# Patient Record
Sex: MALE | Race: WHITE | Employment: OTHER | URBAN - NONMETROPOLITAN AREA
[De-identification: names, ages, dates, MRNs, and addresses within clinical notes are randomized per-mention and may not be internally consistent; named-entity substitution may affect disease eponyms.]

---

## 2019-07-10 ENCOUNTER — HOSPITAL ENCOUNTER (EMERGENCY)
Age: 39
Discharge: HOME | End: 2019-07-11
Attending: EMERGENCY MEDICINE
Payer: COMMERCIAL

## 2019-07-10 DIAGNOSIS — S39.012A LUMBOSACRAL STRAIN, INITIAL ENCOUNTER: Primary | ICD-10-CM

## 2019-07-10 PROCEDURE — 99283 EMERGENCY DEPT VISIT LOW MDM: CPT

## 2019-07-11 ENCOUNTER — APPOINTMENT (OUTPATIENT)
Dept: GENERAL RADIOLOGY | Age: 39
End: 2019-07-11
Payer: COMMERCIAL

## 2019-07-11 VITALS
WEIGHT: 200 LBS | SYSTOLIC BLOOD PRESSURE: 142 MMHG | BODY MASS INDEX: 27.7 KG/M2 | TEMPERATURE: 98.3 F | RESPIRATION RATE: 20 BRPM | HEART RATE: 84 BPM | DIASTOLIC BLOOD PRESSURE: 100 MMHG | OXYGEN SATURATION: 98 %

## 2019-07-11 PROCEDURE — 72100 X-RAY EXAM L-S SPINE 2/3 VWS: CPT

## 2019-07-11 PROCEDURE — 6370000000 HC RX 637 (ALT 250 FOR IP): Performed by: EMERGENCY MEDICINE

## 2019-07-11 PROCEDURE — 6360000002 HC RX W HCPCS: Performed by: EMERGENCY MEDICINE

## 2019-07-11 PROCEDURE — 96372 THER/PROPH/DIAG INJ SC/IM: CPT

## 2019-07-11 RX ORDER — KETOROLAC TROMETHAMINE 30 MG/ML
30 INJECTION, SOLUTION INTRAMUSCULAR; INTRAVENOUS ONCE
Status: COMPLETED | OUTPATIENT
Start: 2019-07-11 | End: 2019-07-11

## 2019-07-11 RX ORDER — LORAZEPAM 0.5 MG/1
0.5 TABLET ORAL EVERY 6 HOURS PRN
COMMUNITY
End: 2021-04-01

## 2019-07-11 RX ORDER — OXYCODONE HYDROCHLORIDE AND ACETAMINOPHEN 5; 325 MG/1; MG/1
1 TABLET ORAL ONCE
Status: COMPLETED | OUTPATIENT
Start: 2019-07-11 | End: 2019-07-11

## 2019-07-11 RX ORDER — ORPHENADRINE CITRATE 30 MG/ML
60 INJECTION INTRAMUSCULAR; INTRAVENOUS ONCE
Status: COMPLETED | OUTPATIENT
Start: 2019-07-11 | End: 2019-07-11

## 2019-07-11 RX ORDER — ORPHENADRINE CITRATE 100 MG/1
100 TABLET, EXTENDED RELEASE ORAL 2 TIMES DAILY PRN
Qty: 30 TABLET | Refills: 0 | Status: SHIPPED | OUTPATIENT
Start: 2019-07-11 | End: 2019-07-21

## 2019-07-11 RX ORDER — KETOROLAC TROMETHAMINE 10 MG/1
10 TABLET, FILM COATED ORAL EVERY 6 HOURS PRN
Qty: 20 TABLET | Refills: 0 | Status: SHIPPED | OUTPATIENT
Start: 2019-07-11 | End: 2020-04-24

## 2019-07-11 RX ADMIN — ORPHENADRINE CITRATE 60 MG: 30 INJECTION INTRAMUSCULAR; INTRAVENOUS at 01:03

## 2019-07-11 RX ADMIN — OXYCODONE HYDROCHLORIDE AND ACETAMINOPHEN 1 TABLET: 5; 325 TABLET ORAL at 01:03

## 2019-07-11 RX ADMIN — KETOROLAC TROMETHAMINE 30 MG: 30 INJECTION, SOLUTION INTRAMUSCULAR at 01:03

## 2019-07-11 ASSESSMENT — ENCOUNTER SYMPTOMS
SORE THROAT: 0
WHEEZING: 0
DIARRHEA: 0
BACK PAIN: 1
COUGH: 0
ABDOMINAL PAIN: 0
NAUSEA: 0
BLOOD IN STOOL: 0
VOMITING: 0
SHORTNESS OF BREATH: 0

## 2019-07-11 ASSESSMENT — PAIN DESCRIPTION - LOCATION: LOCATION: BACK

## 2019-07-11 ASSESSMENT — PAIN DESCRIPTION - PAIN TYPE: TYPE: ACUTE PAIN

## 2019-07-11 ASSESSMENT — PAIN SCALES - GENERAL
PAINLEVEL_OUTOF10: 10
PAINLEVEL_OUTOF10: 10

## 2019-07-11 ASSESSMENT — PAIN DESCRIPTION - ORIENTATION: ORIENTATION: LEFT
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
## 2019-09-21 ENCOUNTER — HOSPITAL ENCOUNTER (EMERGENCY)
Age: 39
Discharge: HOME | End: 2019-09-21
Payer: COMMERCIAL

## 2019-09-21 ENCOUNTER — APPOINTMENT (OUTPATIENT)
Dept: GENERAL RADIOLOGY | Age: 39
End: 2019-09-21
Payer: COMMERCIAL

## 2019-09-21 VITALS
DIASTOLIC BLOOD PRESSURE: 92 MMHG | RESPIRATION RATE: 17 BRPM | TEMPERATURE: 97.8 F | HEART RATE: 88 BPM | SYSTOLIC BLOOD PRESSURE: 154 MMHG | WEIGHT: 200 LBS | BODY MASS INDEX: 27.7 KG/M2 | OXYGEN SATURATION: 98 %

## 2019-09-21 DIAGNOSIS — M79.672 LEFT FOOT PAIN: Primary | ICD-10-CM

## 2019-09-21 PROCEDURE — 73630 X-RAY EXAM OF FOOT: CPT

## 2019-09-21 PROCEDURE — 6370000000 HC RX 637 (ALT 250 FOR IP): Performed by: PHYSICIAN ASSISTANT

## 2019-09-21 PROCEDURE — 99283 EMERGENCY DEPT VISIT LOW MDM: CPT

## 2019-09-21 PROCEDURE — 2709999900 HC NON-CHARGEABLE SUPPLY

## 2019-09-21 RX ORDER — HYDROCODONE BITARTRATE AND ACETAMINOPHEN 5; 325 MG/1; MG/1
1 TABLET ORAL ONCE
Status: COMPLETED | OUTPATIENT
Start: 2019-09-21 | End: 2019-09-21

## 2019-09-21 RX ORDER — LIDOCAINE 40 MG/G
CREAM TOPICAL
Qty: 1 TUBE | Refills: 0 | Status: SHIPPED | OUTPATIENT
Start: 2019-09-21 | End: 2020-04-24

## 2019-09-21 RX ADMIN — HYDROCODONE BITARTRATE AND ACETAMINOPHEN 1 TABLET: 5; 325 TABLET ORAL at 15:27

## 2019-09-21 ASSESSMENT — PAIN DESCRIPTION - PAIN TYPE: TYPE: ACUTE PAIN

## 2019-09-21 ASSESSMENT — ENCOUNTER SYMPTOMS
NAUSEA: 0
ABDOMINAL PAIN: 0
DIARRHEA: 0
RHINORRHEA: 0
SHORTNESS OF BREATH: 0
BACK PAIN: 0
EYE REDNESS: 0
EYE DISCHARGE: 0
VOMITING: 0
WHEEZING: 0
SORE THROAT: 0
COUGH: 0

## 2019-09-21 ASSESSMENT — PAIN DESCRIPTION - ORIENTATION: ORIENTATION: LEFT

## 2019-09-21 ASSESSMENT — PAIN DESCRIPTION - DESCRIPTORS: DESCRIPTORS: ACHING

## 2019-09-21 ASSESSMENT — PAIN SCALES - GENERAL
PAINLEVEL_OUTOF10: 7
PAINLEVEL_OUTOF10: 8

## 2019-09-21 ASSESSMENT — PAIN DESCRIPTION - LOCATION: LOCATION: FOOT
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
## 2019-09-21 NOTE — ED NOTES
Presents with complaints of left foot pain for the past several days. States that he does not remember injuring it in any way.      Meredith Drew RN  09/21/19 6427

## 2020-04-24 ENCOUNTER — APPOINTMENT (OUTPATIENT)
Dept: GENERAL RADIOLOGY | Age: 40
End: 2020-04-24
Payer: MEDICARE

## 2020-04-24 ENCOUNTER — HOSPITAL ENCOUNTER (EMERGENCY)
Age: 40
Discharge: HOME | End: 2020-04-24
Attending: FAMILY MEDICINE
Payer: MEDICARE

## 2020-04-24 VITALS
HEART RATE: 96 BPM | TEMPERATURE: 98.8 F | WEIGHT: 220 LBS | OXYGEN SATURATION: 96 % | DIASTOLIC BLOOD PRESSURE: 102 MMHG | SYSTOLIC BLOOD PRESSURE: 141 MMHG | BODY MASS INDEX: 29.8 KG/M2 | RESPIRATION RATE: 18 BRPM | HEIGHT: 72 IN

## 2020-04-24 PROCEDURE — 96372 THER/PROPH/DIAG INJ SC/IM: CPT

## 2020-04-24 PROCEDURE — 99282 EMERGENCY DEPT VISIT SF MDM: CPT

## 2020-04-24 PROCEDURE — 6370000000 HC RX 637 (ALT 250 FOR IP): Performed by: FAMILY MEDICINE

## 2020-04-24 PROCEDURE — 73630 X-RAY EXAM OF FOOT: CPT

## 2020-04-24 PROCEDURE — 6360000002 HC RX W HCPCS: Performed by: FAMILY MEDICINE

## 2020-04-24 RX ORDER — CLONIDINE HYDROCHLORIDE 0.1 MG/1
0.1 TABLET ORAL ONCE
Status: COMPLETED | OUTPATIENT
Start: 2020-04-24 | End: 2020-04-24

## 2020-04-24 RX ORDER — NAPROXEN 500 MG/1
500 TABLET ORAL 2 TIMES DAILY
Qty: 20 TABLET | Refills: 0 | Status: SHIPPED | OUTPATIENT
Start: 2020-04-24 | End: 2020-07-06

## 2020-04-24 RX ORDER — KETOROLAC TROMETHAMINE 30 MG/ML
30 INJECTION, SOLUTION INTRAMUSCULAR; INTRAVENOUS ONCE
Status: COMPLETED | OUTPATIENT
Start: 2020-04-24 | End: 2020-04-24

## 2020-04-24 RX ADMIN — CLONIDINE HYDROCHLORIDE 0.1 MG: 0.1 TABLET ORAL at 12:07

## 2020-04-24 RX ADMIN — KETOROLAC TROMETHAMINE 30 MG: 30 INJECTION, SOLUTION INTRAMUSCULAR at 12:08

## 2020-04-24 ASSESSMENT — ENCOUNTER SYMPTOMS
NAUSEA: 0
SORE THROAT: 0
BLOOD IN STOOL: 0
ABDOMINAL PAIN: 0
BACK PAIN: 0
VOMITING: 0
RHINORRHEA: 0
SHORTNESS OF BREATH: 0
DIARRHEA: 0
CONSTIPATION: 0
COUGH: 0

## 2020-04-24 ASSESSMENT — PAIN DESCRIPTION - PAIN TYPE: TYPE: CHRONIC PAIN

## 2020-04-24 ASSESSMENT — PAIN DESCRIPTION - ORIENTATION: ORIENTATION: LEFT

## 2020-04-24 ASSESSMENT — PAIN DESCRIPTION - LOCATION: LOCATION: FOOT

## 2020-04-24 ASSESSMENT — PAIN SCALES - GENERAL
PAINLEVEL_OUTOF10: 8
PAINLEVEL_OUTOF10: 8

## 2020-04-24 NOTE — ED PROVIDER NOTES
Normal pulses. Heart sounds: Normal heart sounds. No murmur. No friction rub. No gallop. Pulmonary:      Effort: Pulmonary effort is normal. No respiratory distress. Breath sounds: Normal breath sounds. No decreased breath sounds, wheezing, rhonchi or rales. Abdominal:      General: Bowel sounds are normal. There is no distension. Palpations: Abdomen is soft. Tenderness: There is no abdominal tenderness. There is no guarding or rebound. Musculoskeletal: Normal range of motion. Left foot: Tenderness (mid-foot) present. No swelling or deformity. Comments: No bruising    Skin:     General: Skin is warm and dry. Findings: No rash. Neurological:      Mental Status: He is alert and oriented to person, place, and time. Motor: No abnormal muscle tone. Coordination: Coordination normal.         DIFFERENTIAL DIAGNOSIS:   Strain, sprain, contusion, fracture     DIAGNOSTIC RESULTS     EKG: All EKG's are interpreted by the Emergency Department Physician who either signs or Co-signs this chart in the absence of a cardiologist.  EKG interpreted by Shara Lomeli MD:    None     RADIOLOGY: non-plain film images(s) such as CT, Ultrasound and MRI are read by the radiologist.    XR FOOT LEFT (MIN 3 VIEWS)   Final Result   No acute fracture or dislocation. **This report has been created using voice recognition software. It may contain minor errors which are inherent in voice recognition technology. **      Final report electronically signed by Dr. Barba Or on 4/24/2020 12:30 PM           LABS:   Labs Reviewed - No data to display    EMERGENCY DEPARTMENT COURSE:   Vitals:    Vitals:    04/24/20 1134 04/24/20 1207 04/24/20 1252   BP: (!) 154/114 (!) 154/117 (!) 141/102   Pulse: 96     Resp: 18     Temp: 98.8 °F (37.1 °C)     TempSrc: Oral     SpO2: 96%     Weight: 220 lb (99.8 kg)     Height: 6' (1.829 m)       1150: Left foot XR, Catapres, and Toradol ordered.      1230:

## 2020-07-03 NOTE — PROGRESS NOTES
Called Dr. Jes Ferguson office and left message for [facility] requesting copy of EKG and CXR

## 2020-07-06 RX ORDER — QUETIAPINE FUMARATE 25 MG/1
25 TABLET, FILM COATED ORAL NIGHTLY
COMMUNITY

## 2020-07-06 RX ORDER — DULOXETIN HYDROCHLORIDE 60 MG/1
60 CAPSULE, DELAYED RELEASE ORAL DAILY
COMMUNITY

## 2020-07-06 NOTE — PROGRESS NOTES
In preparation for their surgical procedure above patient was screened for Obstructive Sleep Apnea (MARTINEZ) using the STOP-Bang Questionnaire by the Pre-Admission Testing department. This is a pre-surgical screening tool for patient safety and serves as a recommendation, this WILL NOT cause cancellation of surgery. STOP-Bang Questionnaire  * Do you currently see a pulmonologist?  No     If yes STOP, do not complete. Patient follows with Dr.     1.  Do you snore loudly (able to be heard in the next room)? No    2. Do you often feel tired or sleepy during the daytime? No       3. Has anyone ever told you that you stop breathing during your sleep? No    4. Do you have or are you being treated for high blood pressure? No      5. BMI more than 35? BMI (Calculated): 31.6        No    6. Age over 48 years? 46 y.o. No    7. Neck Circumference greater than 17 inches for male or 16 inches for female? Measured           (visits only)            Not Applicable    8. Gender Male? Yes      TOTAL SCORE: 1    MARTINEZ - Low Risk : Yes to 0 - 2 questions  MARTINEZ - Intermediate Risk : Yes to 3 - 4 questions  MARTINEZ - High Risk : Yes to 5 - 8 questions    Adapted from:   STOP Questionnaire: A Tool to Screen Patients for Obstructive Sleep Apnea   JAMES Bardales.C.P.C., Concetta Schaumann, M.B.B.S., Pedro Suarez M.D., Modesta Hollingsworth. Tariq Yaens, Ph.D., JEANE Henderson.B.B.S., Jackquline Skiff, M.Sc., Salma Palacios M.D., Lenny Nguyen. LUIS A AritaC.P.C.    Anesthesiology 2008; 630:081-88 Copyright 2008, the [address] of [employer]est[employer], [place].   ----------------------------------------------------------------------------------------------------------------

## 2020-07-06 NOTE — PROGRESS NOTES
Following instructions given to patient, who states understanding:    NPO after midnight  [employer] and 's license  Wear comfortable clean clothing  Do not bring jewelry   Shower night before and morning of surgery with a liquid antibacterial soap  Bring medications in original bottles  Follow all instructions given by your physician   needed at discharge  Call [phone] for any questions  Report to [facility] on 2nd floor  If you would become ill prior to surgery, please call the surgeon  May have only 1 visitor accompany you for surgery  Please bring and wear mask  You will be receiving a phone call one or two days before surgery to review covid screening exposure    Covid screening questionnaire complete and negative for symptoms or exposure see chart for documentation. Please limit your exposure to the public after you have your covid test  Please call your doctor immediately if you develop any symptoms of covid prior to your surgery    Patient instructed to come tomorrow 7/7/20 for covid test . Left telephone message for Prince Adames in Dr. Burrows Lighter office to fax covid order.

## 2020-07-07 ENCOUNTER — HOSPITAL ENCOUNTER (OUTPATIENT)
Age: 40
Discharge: HOME | End: 2020-07-07
Payer: MEDICARE

## 2020-07-07 PROCEDURE — U0002 COVID-19 LAB TEST NON-CDC: HCPCS

## 2020-07-08 LAB
PERFORMING LAB: NORMAL
REPORT: NORMAL
SARS-COV-2: NOT DETECTED

## 2020-07-10 ENCOUNTER — ANESTHESIA EVENT (OUTPATIENT)
Dept: OPERATING ROOM | Age: 40
End: 2020-07-10
Payer: MEDICARE

## 2020-07-10 ENCOUNTER — HOSPITAL ENCOUNTER (OUTPATIENT)
Age: 40
Setting detail: OUTPATIENT SURGERY
Discharge: HOME | End: 2020-07-10
Attending: PODIATRIST | Admitting: PODIATRIST
Payer: MEDICARE

## 2020-07-10 ENCOUNTER — ANESTHESIA (OUTPATIENT)
Dept: OPERATING ROOM | Age: 40
End: 2020-07-10
Payer: MEDICARE

## 2020-07-10 VITALS
RESPIRATION RATE: 16 BRPM | DIASTOLIC BLOOD PRESSURE: 105 MMHG | WEIGHT: 226 LBS | HEIGHT: 70 IN | SYSTOLIC BLOOD PRESSURE: 155 MMHG | TEMPERATURE: 98 F | BODY MASS INDEX: 32.35 KG/M2 | OXYGEN SATURATION: 92 % | HEART RATE: 122 BPM

## 2020-07-10 VITALS — TEMPERATURE: 97 F | SYSTOLIC BLOOD PRESSURE: 131 MMHG | OXYGEN SATURATION: 99 % | DIASTOLIC BLOOD PRESSURE: 86 MMHG

## 2020-07-10 PROCEDURE — 6360000002 HC RX W HCPCS: Performed by: STUDENT IN AN ORGANIZED HEALTH CARE EDUCATION/TRAINING PROGRAM

## 2020-07-10 PROCEDURE — 2500000003 HC RX 250 WO HCPCS: Performed by: PODIATRIST

## 2020-07-10 PROCEDURE — 3600000004 HC SURGERY LEVEL 4 BASE: Performed by: PODIATRIST

## 2020-07-10 PROCEDURE — 6360000002 HC RX W HCPCS: Performed by: ANESTHESIOLOGY

## 2020-07-10 PROCEDURE — 6360000002 HC RX W HCPCS: Performed by: NURSE ANESTHETIST, CERTIFIED REGISTERED

## 2020-07-10 PROCEDURE — 7100000000 HC PACU RECOVERY - FIRST 15 MIN: Performed by: PODIATRIST

## 2020-07-10 PROCEDURE — 3600000014 HC SURGERY LEVEL 4 ADDTL 15MIN: Performed by: PODIATRIST

## 2020-07-10 PROCEDURE — 2709999900 HC NON-CHARGEABLE SUPPLY: Performed by: PODIATRIST

## 2020-07-10 PROCEDURE — 3700000001 HC ADD 15 MINUTES (ANESTHESIA): Performed by: PODIATRIST

## 2020-07-10 PROCEDURE — 7100000010 HC PHASE II RECOVERY - FIRST 15 MIN: Performed by: PODIATRIST

## 2020-07-10 PROCEDURE — 2580000003 HC RX 258: Performed by: PODIATRIST

## 2020-07-10 PROCEDURE — 7100000011 HC PHASE II RECOVERY - ADDTL 15 MIN: Performed by: PODIATRIST

## 2020-07-10 PROCEDURE — 3700000000 HC ANESTHESIA ATTENDED CARE: Performed by: PODIATRIST

## 2020-07-10 PROCEDURE — 6360000002 HC RX W HCPCS

## 2020-07-10 PROCEDURE — 7100000001 HC PACU RECOVERY - ADDTL 15 MIN: Performed by: PODIATRIST

## 2020-07-10 RX ORDER — HYDRALAZINE HYDROCHLORIDE 20 MG/ML
10 INJECTION INTRAMUSCULAR; INTRAVENOUS EVERY 10 MIN PRN
Status: COMPLETED | OUTPATIENT
Start: 2020-07-10 | End: 2020-07-10

## 2020-07-10 RX ORDER — CEFADROXIL 500 MG/1
500 CAPSULE ORAL 2 TIMES DAILY
Qty: 20 CAPSULE | Refills: 0 | Status: SHIPPED | OUTPATIENT
Start: 2020-07-10 | End: 2020-07-20

## 2020-07-10 RX ORDER — HYDRALAZINE HYDROCHLORIDE 20 MG/ML
INJECTION INTRAMUSCULAR; INTRAVENOUS PRN
Status: DISCONTINUED | OUTPATIENT
Start: 2020-07-10 | End: 2020-07-10 | Stop reason: SDUPTHER

## 2020-07-10 RX ORDER — CYCLOBENZAPRINE HCL 5 MG
10 TABLET ORAL 3 TIMES DAILY PRN
Qty: 40 TABLET | Refills: 0 | Status: SHIPPED | OUTPATIENT
Start: 2020-07-10 | End: 2020-07-20

## 2020-07-10 RX ORDER — FENTANYL CITRATE 50 UG/ML
INJECTION, SOLUTION INTRAMUSCULAR; INTRAVENOUS PRN
Status: DISCONTINUED | OUTPATIENT
Start: 2020-07-10 | End: 2020-07-10 | Stop reason: SDUPTHER

## 2020-07-10 RX ORDER — ONDANSETRON 2 MG/ML
4 INJECTION INTRAMUSCULAR; INTRAVENOUS EVERY 6 HOURS PRN
Status: DISCONTINUED | OUTPATIENT
Start: 2020-07-10 | End: 2020-07-10 | Stop reason: HOSPADM

## 2020-07-10 RX ORDER — HYDROCODONE BITARTRATE AND ACETAMINOPHEN 5; 325 MG/1; MG/1
1 TABLET ORAL EVERY 4 HOURS PRN
Status: DISCONTINUED | OUTPATIENT
Start: 2020-07-10 | End: 2020-07-10 | Stop reason: HOSPADM

## 2020-07-10 RX ORDER — PROPOFOL 10 MG/ML
INJECTION, EMULSION INTRAVENOUS PRN
Status: DISCONTINUED | OUTPATIENT
Start: 2020-07-10 | End: 2020-07-10 | Stop reason: SDUPTHER

## 2020-07-10 RX ORDER — SODIUM CHLORIDE 0.9 % (FLUSH) 0.9 %
10 SYRINGE (ML) INJECTION EVERY 12 HOURS SCHEDULED
Status: DISCONTINUED | OUTPATIENT
Start: 2020-07-10 | End: 2020-07-10 | Stop reason: HOSPADM

## 2020-07-10 RX ORDER — SODIUM CHLORIDE 0.9 % (FLUSH) 0.9 %
10 SYRINGE (ML) INJECTION PRN
Status: DISCONTINUED | OUTPATIENT
Start: 2020-07-10 | End: 2020-07-10 | Stop reason: HOSPADM

## 2020-07-10 RX ORDER — HYDROCODONE BITARTRATE AND ACETAMINOPHEN 5; 325 MG/1; MG/1
2 TABLET ORAL EVERY 4 HOURS PRN
Status: DISCONTINUED | OUTPATIENT
Start: 2020-07-10 | End: 2020-07-10 | Stop reason: HOSPADM

## 2020-07-10 RX ORDER — PROMETHAZINE HYDROCHLORIDE 25 MG/1
12.5 TABLET ORAL EVERY 6 HOURS PRN
Status: DISCONTINUED | OUTPATIENT
Start: 2020-07-10 | End: 2020-07-10 | Stop reason: HOSPADM

## 2020-07-10 RX ORDER — SODIUM CHLORIDE 9 MG/ML
INJECTION, SOLUTION INTRAVENOUS CONTINUOUS
Status: DISCONTINUED | OUTPATIENT
Start: 2020-07-10 | End: 2020-07-10 | Stop reason: HOSPADM

## 2020-07-10 RX ORDER — ONDANSETRON 2 MG/ML
INJECTION INTRAMUSCULAR; INTRAVENOUS PRN
Status: DISCONTINUED | OUTPATIENT
Start: 2020-07-10 | End: 2020-07-10 | Stop reason: SDUPTHER

## 2020-07-10 RX ORDER — HYDROCODONE BITARTRATE AND ACETAMINOPHEN 5; 325 MG/1; MG/1
1 TABLET ORAL EVERY 4 HOURS PRN
Qty: 42 TABLET | Refills: 0 | Status: SHIPPED | OUTPATIENT
Start: 2020-07-10 | End: 2020-07-17

## 2020-07-10 RX ORDER — BUPIVACAINE HYDROCHLORIDE 5 MG/ML
INJECTION, SOLUTION EPIDURAL; INTRACAUDAL PRN
Status: DISCONTINUED | OUTPATIENT
Start: 2020-07-10 | End: 2020-07-10 | Stop reason: ALTCHOICE

## 2020-07-10 RX ORDER — DEXAMETHASONE SODIUM PHOSPHATE 4 MG/ML
INJECTION, SOLUTION INTRA-ARTICULAR; INTRALESIONAL; INTRAMUSCULAR; INTRAVENOUS; SOFT TISSUE PRN
Status: DISCONTINUED | OUTPATIENT
Start: 2020-07-10 | End: 2020-07-10 | Stop reason: SDUPTHER

## 2020-07-10 RX ORDER — HYDRALAZINE HYDROCHLORIDE 20 MG/ML
INJECTION INTRAMUSCULAR; INTRAVENOUS
Status: COMPLETED
Start: 2020-07-10 | End: 2020-07-10

## 2020-07-10 RX ORDER — RIVAROXABAN 10 MG/1
10 TABLET, FILM COATED ORAL
Qty: 14 TABLET | Refills: 0 | Status: SHIPPED | OUTPATIENT
Start: 2020-07-10 | End: 2020-07-24

## 2020-07-10 RX ADMIN — HYDRALAZINE HYDROCHLORIDE 10 MG: 20 INJECTION, SOLUTION INTRAMUSCULAR; INTRAVENOUS at 15:35

## 2020-07-10 RX ADMIN — DEXAMETHASONE SODIUM PHOSPHATE 8 MG: 4 INJECTION, SOLUTION INTRAMUSCULAR; INTRAVENOUS at 14:29

## 2020-07-10 RX ADMIN — SODIUM CHLORIDE: 9 INJECTION, SOLUTION INTRAVENOUS at 12:54

## 2020-07-10 RX ADMIN — FENTANYL CITRATE 50 MCG: 50 INJECTION, SOLUTION INTRAMUSCULAR; INTRAVENOUS at 14:44

## 2020-07-10 RX ADMIN — ONDANSETRON HYDROCHLORIDE 4 MG: 4 INJECTION, SOLUTION INTRAMUSCULAR; INTRAVENOUS at 14:29

## 2020-07-10 RX ADMIN — FENTANYL CITRATE 100 MCG: 50 INJECTION, SOLUTION INTRAMUSCULAR; INTRAVENOUS at 14:24

## 2020-07-10 RX ADMIN — PROPOFOL 150 MG: 10 INJECTION, EMULSION INTRAVENOUS at 14:24

## 2020-07-10 RX ADMIN — HYDRALAZINE HYDROCHLORIDE 10 MG: 20 INJECTION INTRAMUSCULAR; INTRAVENOUS at 15:45

## 2020-07-10 RX ADMIN — HYDROMORPHONE HYDROCHLORIDE 0.5 MG: 1 INJECTION, SOLUTION INTRAMUSCULAR; INTRAVENOUS; SUBCUTANEOUS at 15:30

## 2020-07-10 RX ADMIN — HYDROMORPHONE HYDROCHLORIDE 0.5 MG: 1 INJECTION, SOLUTION INTRAMUSCULAR; INTRAVENOUS; SUBCUTANEOUS at 15:25

## 2020-07-10 RX ADMIN — FENTANYL CITRATE 50 MCG: 50 INJECTION, SOLUTION INTRAMUSCULAR; INTRAVENOUS at 14:38

## 2020-07-10 RX ADMIN — PROPOFOL 50 MG: 10 INJECTION, EMULSION INTRAVENOUS at 14:38

## 2020-07-10 RX ADMIN — HYDRALAZINE HYDROCHLORIDE 10 MG: 20 INJECTION INTRAMUSCULAR; INTRAVENOUS at 15:35

## 2020-07-10 RX ADMIN — CEFAZOLIN 2 G: 1 INJECTION, POWDER, FOR SOLUTION INTRAMUSCULAR; INTRAVENOUS at 14:31

## 2020-07-10 RX ADMIN — PROPOFOL 50 MG: 10 INJECTION, EMULSION INTRAVENOUS at 15:08

## 2020-07-10 RX ADMIN — HYDRALAZINE HYDROCHLORIDE 10 MG: 20 INJECTION INTRAMUSCULAR; INTRAVENOUS at 14:45

## 2020-07-10 ASSESSMENT — PULMONARY FUNCTION TESTS
PIF_VALUE: 3
PIF_VALUE: 4
PIF_VALUE: 0
PIF_VALUE: 7
PIF_VALUE: 4
PIF_VALUE: 5
PIF_VALUE: 5
PIF_VALUE: 6
PIF_VALUE: 4
PIF_VALUE: 5
PIF_VALUE: 4
PIF_VALUE: 5
PIF_VALUE: 7
PIF_VALUE: 5
PIF_VALUE: 21
PIF_VALUE: 7
PIF_VALUE: 3
PIF_VALUE: 4
PIF_VALUE: 5
PIF_VALUE: 4
PIF_VALUE: 5
PIF_VALUE: 31
PIF_VALUE: 4
PIF_VALUE: 7
PIF_VALUE: 4
PIF_VALUE: 5
PIF_VALUE: 8
PIF_VALUE: 4
PIF_VALUE: 3
PIF_VALUE: 5
PIF_VALUE: 6
PIF_VALUE: 22
PIF_VALUE: 3
PIF_VALUE: 5
PIF_VALUE: 5
PIF_VALUE: 24
PIF_VALUE: 4
PIF_VALUE: 1
PIF_VALUE: 22
PIF_VALUE: 5
PIF_VALUE: 4
PIF_VALUE: 6
PIF_VALUE: 4
PIF_VALUE: 7
PIF_VALUE: 5
PIF_VALUE: 5
PIF_VALUE: 23
PIF_VALUE: 19
PIF_VALUE: 4
PIF_VALUE: 6
PIF_VALUE: 2

## 2020-07-10 ASSESSMENT — PAIN SCALES - GENERAL
PAINLEVEL_OUTOF10: 0
PAINLEVEL_OUTOF10: 6
PAINLEVEL_OUTOF10: 7
PAINLEVEL_OUTOF10: 0

## 2020-07-10 ASSESSMENT — PAIN - FUNCTIONAL ASSESSMENT: PAIN_FUNCTIONAL_ASSESSMENT: 0-10

## 2020-07-10 NOTE — PROGRESS NOTES
Pt has met discharge criteria and states he is ready for discharge to home. IV removed, gauze and tape applied. Dressed in own clothes and personal belongings gathered. Discharge instructions (with opioid medication education information) given to pt.; pt verbalized understanding of discharge instructions, prescriptions and follow up appointments. Pt transported to discharge lobby by [facility] staff. Transported home by black and white [facility] service.

## 2020-07-10 NOTE — ANESTHESIA PRE PROCEDURE
Department of Anesthesiology  Preprocedure Note       Name:  Socrates Mcwilliams   Age:  46 y.o.  :  1980                                          MRN:  [id number]         Date:  7/10/2020      Surgeon: Peyton Degree):  Beba Buck DPM    Procedure: Procedure(s):  LEFT TIBIAL BONE MARROW HARVEST, LEFT TARSAL TUNNEL DECOMPRESSION, INSTEP PLANTAR FASCIOTOMY, POSTERIOR COMPARTMENT FASCIOTOMY, MORTONS NEUROMA    Medications prior to admission:   Prior to Admission medications    Medication Sig Start Date End Date Taking? Authorizing Provider   HYDROcodone-acetaminophen (NORCO) 5-325 MG per tablet Take 1 tablet by mouth every 4 hours as needed for Pain for up to 7 days. Intended supply: 3 days. Take lowest dose possible to manage pain 7/10/20 7/17/20 Yes Beba Buck DPM   rivaroxaban (XARELTO) 10 MG TABS tablet Take 1 tablet by mouth daily (with breakfast) for 14 days 7/10/20 7/24/20 Yes Beba Buck DPM   cefadroxil (DURICEF) 500 MG capsule Take 1 capsule by mouth 2 times daily for 10 days 7/10/20 7/20/20 Yes Beba Buck DPM   cyclobenzaprine (FLEXERIL) 5 MG tablet Take 2 tablets by mouth 3 times daily as needed for Muscle spasms 7/10/20 7/20/20 Yes Beba Buck DPM   DULoxetine (CYMBALTA) 60 MG extended release capsule Take 60 mg by mouth daily   Yes Historical Provider, MD   QUEtiapine (SEROQUEL) 25 MG tablet Take 25 mg by mouth nightly   Yes Historical Provider, MD   naproxen (NAPROSYN) 500 MG tablet Take 1 tablet by mouth 2 times daily for 10 days 20 Yes Everardo Barrera MD   acetaminophen (TYLENOL ARTHRITIS PAIN) 650 MG CR tablet Take 650 mg by mouth 2 times daily. Yes Historical Provider, MD   LORazepam (ATIVAN) 0.5 MG tablet Take 0.5 mg by mouth every 6 hours as needed for Anxiety.     Historical Provider, MD       Current medications:    Current Facility-Administered Medications   Medication Dose Route Frequency Provider Last Rate Last Dose    sodium chloride flush 0.9 % injection 10 mL  10 mL Intravenous 2 times per day Louana Lent, DPM        sodium chloride flush 0.9 % injection 10 mL  10 mL Intravenous PRN Louana Lent, DPM        ceFAZolin (ANCEF) 2 g in dextrose 5 % 50 mL IVPB  2 g Intravenous On Call to [facility], DPM        0.9 % sodium chloride infusion   Intravenous Continuous Lella Elieser,  mL/hr at 07/10/20 1254         Allergies: Allergies   Allergen Reactions    Robitussin [Guaifenesin] Hives       Problem List:    Patient Active Problem List   Diagnosis Code    Anxiety F41.9    Back pain, chronic M54.9, G89.29       Past Medical History:        Diagnosis Date    Anxiety     CAD (coronary artery disease)     Chronic back pain     Depression     Headache(784.0)     Hypertension     TBI (traumatic brain injury) ([facility] [address]     Unspecified sleep apnea        Past Surgical History:        Procedure Laterality Date    TRACHEOSTOMY CLOSURE      TRACHEOTOMY         Social History:    Social History     Tobacco Use    Smoking status: Current Every Day Smoker     Types: Cigarettes     Start date: 1/1/2014    Smokeless tobacco: Never Used   Substance Use Topics    Alcohol use:  No                                Ready to quit: Not Answered  Counseling given: Not Answered      Vital Signs (Current):   Vitals:    07/06/20 1552 07/10/20 1217 07/10/20 1223   BP:   (!) 173/119   Pulse:   75   Resp:   18   Temp:  97.2 °F (36.2 °C)    TempSrc:  Temporal    Weight: 220 lb (99.8 kg)  226 lb (102.5 kg)   Height: 5' 10\" (1.778 m)  5' 10\" (1.778 m)                                              BP Readings from Last 3 Encounters:   07/10/20 (!) 173/119   04/24/20 (!) 141/102   09/21/19 (!) 154/92       NPO Status: Time of last liquid consumption: 0900                        Time of last solid consumption: 2200                        Date of last liquid consumption: 07/10/20                        Date of last solid food consumption: 07/09/20    BMI: Wt Readings from Last 3 Encounters:   07/10/20 226 lb (102.5 kg)   04/24/20 220 lb (99.8 kg)   09/21/19 200 lb (90.7 kg)     Body mass index is 32.43 kg/m². CBC: No results found for: WBC, RBC, HGB, HCT, MCV, RDW, PLT    CMP: No results found for: NA, K, CL, CO2, BUN, CREATININE, GFRAA, AGRATIO, LABGLOM, GLUCOSE, PROT, CALCIUM, BILITOT, ALKPHOS, AST, ALT    POC Tests: No results for input(s): POCGLU, POCNA, POCK, POCCL, POCBUN, POCHEMO, POCHCT in the last 72 hours. Coags: No results found for: PROTIME, INR, APTT    HCG (If Applicable): No results found for: PREGTESTUR, PREGSERUM, HCG, HCGQUANT     ABGs: No results found for: PHART, PO2ART, OJQ4URG, MMX1HPR, BEART, N7AEARTV     Type & Screen (If Applicable):  No results found for: LABABO, LABRH    Drug/Infectious Status (If Applicable):  No results found for: HIV, HEPCAB    COVID-19 Screening (If Applicable):   Lab Results   Component Value Date    COVID19 NOT DETECTED 07/07/2020         Anesthesia Evaluation   no history of anesthetic complications:   Airway: Mallampati: II  TM distance: >3 FB   Neck ROM: full  Mouth opening: > = 3 FB Dental:          Pulmonary:normal exam    (+) sleep apnea:            Patient did not smoke on day of surgery. Cardiovascular:  Exercise tolerance: good (>4 METS),   (+) hypertension:,                   Neuro/Psych:   (+) headaches:, psychiatric history:            GI/Hepatic/Renal: Neg GI/Hepatic/Renal ROS            Endo/Other: Negative Endo/Other ROS             Pt had no PAT visit       Abdominal:           Vascular:                                        Anesthesia Plan      general     ASA 3       Induction: intravenous. MIPS: Postoperative opioids intended and Prophylactic antiemetics administered. Anesthetic plan and risks discussed with patient. Plan discussed with WALT Zaman MD   7/10/2020

## 2020-07-10 NOTE — PROGRESS NOTES
ADMITTED TO [facility] AND ORIENTED TO UNIT. SCDS ON. FALL AND ALLERGY BANDS ON. PT VERBALIZED APPROVAL FOR FIRST NAME, LAST INITIAL AND PHYSICIAN NAME ON UNIT WHITEBOARD.

## 2020-07-10 NOTE — BRIEF OP NOTE
Brief Postoperative Note      Patient: Iliana Hernandez  YOB: 1980  MRN: [id number]    Date of Procedure: 7/10/2020    Pre-Op Diagnosis: LEFT TARSAL TUNNEL SYNDROME, PLANTAR FASCITITIS, MORTONS NEUROMA    Post-Op Diagnosis: Same       Procedure(s):  LEFT TIBIAL BONE MARROW HARVEST, LEFT TARSAL TUNNEL DECOMPRESSION, INSTEP PLANTAR FASCIOTOMY, POSTERIOR COMPARTMENT FASCIOTOMY, MORTONS NEUROMA    Surgeon(s):  Lola Santacruz DPM    Assistant:  Resident: Jose A Landaverde DPM    Anesthesia: General    Estimated Blood Loss (mL): Minimal    Complications: None    Specimens:   * No specimens in log *    Implants:  * No implants in log *      Drains: * No LDAs found *    Findings: moises WELLS neurommarty    Electronically signed by Lola Santacruz DPM on 7/10/2020 at 3:07 PM

## 2020-07-10 NOTE — BRIEF OP NOTE
Brief Postoperative Note      Patient: Xiomy Solis  YOB: 1980  MRN: [id number]    Date of Procedure: 7/10/2020    Pre-Op Diagnosis: LEFT TARSAL TUNNEL SYNDROME, LEFT PLANTAR FASCIITIS AND LEFT BLOCK'S NEUROMA    Post-Op Diagnosis: Same       Procedure(s):  LEFT TIBIAL BONE MARROW HARVEST, LEFT TARSAL TUNNEL DECOMPRESSION, LEFT INSTEP PLANTAR FASCIOTOMY AND LEFT BLOCK'S [address]    Surgeon(s):  Susie Arreguin DPM    Assistant:  Resident: Severino Peralta DPM    Anesthesia: General    Estimated Blood Loss (mL): Minimal    Hemostasis: Left Thigh Tourniquet @ 325 mmHg    Injectables: 30 cc of 1/2% Marcaine Plain    Suture: 3-0 Monocryl, Vicryl and Prolene    Complications: None    Specimens: None    Implants: None      Drains: None    Findings: Same as Pre-Op Diagnosis    Electronically signed by Severino Peralta DPM on 7/10/2020 at 3:24 PM

## 2020-07-10 NOTE — PROGRESS NOTES
Pt returned to [facility] room 5. Vitals and assessment as charted. 0.9 infusing, 150 to count from PACU. Pt has MUFFIN AND PEPSI. Pt verbalized understanding of discharge criteria and call light use. Call light in reach.

## 2020-07-10 NOTE — OP NOTE
Zenaida Sprague 61    [place], [place]  RECORD OF OPERATION    Name Jayden Magana                                                             : 1980  MEDICAL RECORD NO. [id number]    DATE: 7/10/2020    Surgeon: Stacy Villa DPM    Assist: Silvino Monroy, PGY II    Pre-operative Diagnosis:    1. Left Tarsal Tunnel   2. Left Plantar fasciitis   3. Left mortons neuroma    Post-operative Diagnosis:    Same    Procedure:    1. Left Tibial BMA harvest   2. Left Tarsal Tunnel decompression   3. Left Posterior Compartment fasciotomy   4. Left instep plantar fasciotomy   5. Left mortons neuroma excision     Anesthesia: general, 2gm ancef    Hemostasis: A well padded pneumatic thigh Tourniquet at 325 mmHG for 30 min    Estimated Blood Loss: tourniquet + 10cc    Materials: n/a    Injectables: n/a    Condition: stable    Complications: none     Specimens: were not obtained    INDICATIONS:  The patient is a pleasant 38yo who presented to [address] [place] [address] office complaining of left foot pain. At this time all conservative options have been exhausted and surgical intervention is now necessary. The procedure has been explained in detail to the patient, outlining the risks, benefits and possible complications including the need for further surgery. No promises have been made as to the surgical outcome. Patient was seen pre-operatively. Consent was reviewed and signed, operative limb marked. All questions and concerns regarding the case were addressed. The patient was then transferred to the operative room and place in a supine position. Time out taken, verifying patient and planned procedure. A well padded thigh tourniquet was applied to the left leg. Patient was administered general anesthesia. Surgical site  was then prepped using chlorhexadine and draped using sterile technique.       Procedure In Detail  1. left Tibial Bone Marrow Aspirate [place]  Attention was directed to the proximal tibia. A small stab incision was made full thickness with a 15 scalpel blade along the anterior medial face of the tibia, just distal to the tibial tuberosity. Blunt dissection was carried out with a hemostat. A jamshiidi needle was then introduced through the tibial cortex into the intramedullary canal. Approximately 60cc of bone marrow aspirate was procured from the tibial. It was processed/spun down, and later introduced into the operative site. The surgical incision was re-approximated with a 3-0 monocryl subcutaneous and 4-0 monocryl subcuticular suture. Tincture of benzoin was placed on the incision site with application of steri-strips and a small dress sponge/tegaderm. 2. left Tarsal Tunnel Decompression/Release  Attention was then directed to the medial aspect of the left foot. A curvilinear incision was made starting just posterior-proximal to the medial malleoli, extending distally to the navicular tuberosity. Blunt dissection was then utilized down to the level of the laciniate ligament, making sure to maintain careful retraction of the skin edges, cauterizing any visibly bleeding vessels. The laciniate ligament was incised. Further micro-dissection with the use of Loops was utilized, providing for visualization of the medial neurovascular bundle, including the trifurcation of the Posterior tibial nerve, as it enters the [employer]. Soft tissue adhesions and scar tissue were carefully dissected and removed, to allow for complete decompression of the neurovascular structures present. 3. left Posterior Compartment fasciotomy  Following the tarsal tunnel release, extending down into the elidia pedis, attention was directed proximally to the posterior outlet of the posterior compartment. Patient was noted to have a moderate constriction where the neurovascular bundle exited the posterior compartment into the tarsal tunnel.  Using a combination of cherelle/AN retractors, a long metzenbaum scissor was used to complete a posterior compartment fasciotomy, allowing for easy access with the index finger into the posterior calf. 4. Left instep plantar fasciotomy  During dissection of the left elidia pedis, the medial plantar fascial slip was identified. A hayes scissor was used to incise/tenotomize the medial 1/2 of the plantar fascial band. 5. Left mortons neuroma excision  A plantar incision was made along the plantar forefoot, between the bissection of the 3rd and 4th metatarsal heads. Initial incision was made full thickness with a scalpel, followed by Daya Cavazose and bovie for blunt dissection. The intermetatarsal nerve with distal based digital branches was identified. Patient noted to have moderate neuroma formation at the bissection. The distal branches were sharply cut with a scalpel, followed by use of a vicryl stitch to reposition the nerve stump into the plantar vault of the forefoot. The needle was passed through the plantar skin allowing for sheltering of the nerve in the plantar musculature. Following completion of stated procedures, the operative sites were flushed with copious amounts of normal sterile saline. The underlying capsular/periosteal structures were re-approximated with running 3-0 vicryl, followed by 3-0 monocryl respectfully for subcutaneous and skin closure medially, and prolene for skin plantarly. The incision site was painted with tincture of benzoin with overlying steristrip placement. A betadine wet to dry dressing was applied with a posterior splint and ace bandage. Patient tolerated procedure well, was transferred to PACU in stable condition.      Tena Phoenix, [address] [place] [address]   7/10/2020     Electronically signed by Tena Phoenix, DPM on 7/10/2020 at 3:07 PM

## 2020-07-10 NOTE — H&P
Department of Surgery  H&P Interval Note  Outpatient History and Physical was reviewed pre-operatively, with no interval changes to note prior to scheduled surgical intervention. Patient was assessed, all questions/concerns regarding anaya-operative management were addressed to patient satisfaction. Operative site was marked prior to transportation to the operative room. Lisette Wang D.P.M.

## 2020-07-12 ENCOUNTER — HOSPITAL ENCOUNTER (EMERGENCY)
Age: 40
Discharge: HOME | End: 2020-07-12
Payer: MEDICARE

## 2020-07-12 VITALS
RESPIRATION RATE: 18 BRPM | DIASTOLIC BLOOD PRESSURE: 99 MMHG | TEMPERATURE: 97.9 F | HEART RATE: 99 BPM | OXYGEN SATURATION: 95 % | SYSTOLIC BLOOD PRESSURE: 137 MMHG

## 2020-07-12 PROCEDURE — 2709999900 HC NON-CHARGEABLE SUPPLY

## 2020-07-12 PROCEDURE — 29515 APPLICATION SHORT LEG SPLINT: CPT

## 2020-07-12 PROCEDURE — 99282 EMERGENCY DEPT VISIT SF MDM: CPT

## 2020-07-12 ASSESSMENT — PAIN DESCRIPTION - ORIENTATION: ORIENTATION: LEFT

## 2020-07-12 ASSESSMENT — PAIN SCALES - GENERAL: PAINLEVEL_OUTOF10: 7

## 2020-07-12 ASSESSMENT — PAIN DESCRIPTION - LOCATION: LOCATION: FOOT

## 2020-07-12 ASSESSMENT — PAIN DESCRIPTION - PAIN TYPE: TYPE: ACUTE PAIN

## 2020-07-12 NOTE — ED NOTES
Pt presents to ED with c/o needing his cast redone. Pt states he had it placed following a surgery on 7/10. Pt states he has been outside smoking cigarettes when it was raining and tried to wrap it in bags but states it still got soaked. Pts cast is visibly soiled. Pt denies any other complaints at this time. Call light in reach, will continue to monitor.       Rebecca Laird RN  07/12/20 [id number]

## 2020-07-12 NOTE — ED PROVIDER NOTES
CYCLOBENZAPRINE (FLEXERIL) 5 MG TABLET    Take 2 tablets by mouth 3 times daily as needed for Muscle spasms    DULOXETINE (CYMBALTA) 60 MG EXTENDED RELEASE CAPSULE    Take 60 mg by mouth daily    HYDROCODONE-ACETAMINOPHEN (NORCO) 5-325 MG PER TABLET    Take 1 tablet by mouth every 4 hours as needed for Pain for up to 7 days. Intended supply: 3 days. Take lowest dose possible to manage pain    LORAZEPAM (ATIVAN) 0.5 MG TABLET    Take 0.5 mg by mouth every 6 hours as needed for Anxiety. NAPROXEN (NAPROSYN) 500 MG TABLET    Take 1 tablet by mouth 2 times daily for 10 days    QUETIAPINE (SEROQUEL) 25 MG TABLET    Take 25 mg by mouth nightly    RIVAROXABAN (XARELTO) 10 MG TABS TABLET    Take 1 tablet by mouth daily (with breakfast) for 14 days       ALLERGIES     is allergic to robitussin [guaifenesin]. FAMILY HISTORY     He indicated that his mother is . He indicated that his father is alive. family history includes Diabetes in his mother; High Blood Pressure in his father. SOCIAL HISTORY      reports that he has been smoking cigarettes. He started smoking about 6 years ago. He has been smoking about 0.50 packs per day. He has never used smokeless tobacco. He reports that he does not drink alcohol or use drugs. PHYSICAL EXAM     INITIAL VITALS:  pulse is 99. His respiration is 18 and oxygen saturation is 95%. Physical Exam    General: Andrew Riedel is a well nourished male who is nontoxic in appearance and not in any acute distress. he is awake, alert and oriented. Pulmonary: The respiratory effort is nonlabored. Cardiac: There is a regular rate. Abdomen: The abdomen is soft, nontender, and nondistended. There is no appreciable organomegaly. Extremities: Posterior short leg splint is in place to the left lower extremity and is damp. Toes are normal coloration and temperature. Brisk capillary refill. Neurovascularly intact. Skin: The skin is warm and dry.   There is no rash.  There is no edema. Neuro:  Sensation is grossly intact. There is no focal neurologic deficit. Gait is without ataxia. Psych: Behavior and speech are appropriate. Normal insight. DIFFERENTIAL DIAGNOSIS:   Includes but is not limited to: Need for splint letter placement, postoperative wound check, postoperative pain, other    DIAGNOSTIC RESULTS     EKG: All EKG's are interpreted by the Emergency Department Physician who either signs or Co-signs this chart in the absence of a cardiologist.  None    RADIOLOGY: non-plain film images(s) such as CT, Ultrasound and MRI are read by the radiologist.  Plain radiographic images are visualized and preliminarily interpreted by the emergency physician unless otherwise stated below. No orders to display         LABS:   Labs Reviewed - No data to display      EMERGENCYDEPARTMENT COURSE AND MEDICAL DECISION MAKING:   Vitals:    Vitals:    07/12/20 1753   Pulse: 99   Resp: 18   SpO2: 95%         Pertinent Labs & Imaging studies reviewed. (See chart for details)           Controlled Substances Monitoring:     No flowsheet data found. Patient is well-appearing. The splint was taken down and replaced with a clean, dry posterior short leg splint. He is neurovascularly intact pre-and post splint placement. I discussed the case with Dr. Jonn Fabian. She is in agreement with the plan for splint replacement. The patient has scheduled appointment follow-up with Dr. Grace Borrero this upcoming week. He is stable for discharge home at this time. There is no indication for further work-up or treatment at this time. Strict return precautions and follow up instructions were discussed with the patient with which the patient agrees     Physical assessment findings, diagnostic testing(s) if applicable, and vital signs reviewed with patient/patient representative. Questions answered. Medications as directed, including OTC medications for supportive care. Education provided on medications. Differential diagnosis(s) discussed with patient/patient representative. Home care/self care instructions reviewed with patient/patient representative. Patient is to follow-up with family care provider in 2-3 days if no improvement. Patient is to go to the emergency department if symptoms worsen. Patient/patient representative is aware of care plan, questions answered, verbalizes understanding and is in agreement. ED Medications administered this visit: Medications - No data to display        I have given the patient strict written and verbal instructions about care at home, follow-up, and signs and symptoms of worsening of condition and they did verbalize understanding. Patient was seen independently by myself. The Patient's final impression and disposition and plan was determined by myself. CRITICAL CARE:   None    CONSULTS:  Podiatry    PROCEDURES:  None    FINAL IMPRESSION      1. Aftercare for cast or splint check or change    2.  Post-operative state          DISPOSITION/PLAN   DISPOSITION Discharge - Pending Orders Complete 07/12/2020 06:18:48 PM         PATIENT REFERRED TO:  Mendoza Conley, [address] [place] [address]  [phone]      as scheduled      DISCHARGE MEDICATIONS:  New Prescriptions    No medications on file       (Please note that portions of this note were completed with a voice recognition program.  Efforts were made to edit the dictations but occasionally words are mis-transcribed.)    Belkis Mcduffie, [address] [place] [address]  07/12/20 3968

## 2021-01-08 ENCOUNTER — HOSPITAL ENCOUNTER (EMERGENCY)
Age: 41
Discharge: HOME | End: 2021-01-08
Payer: MEDICARE

## 2021-01-08 VITALS
HEART RATE: 88 BPM | OXYGEN SATURATION: 97 % | SYSTOLIC BLOOD PRESSURE: 149 MMHG | TEMPERATURE: 96.6 F | WEIGHT: 228 LBS | RESPIRATION RATE: 16 BRPM | HEIGHT: 71 IN | BODY MASS INDEX: 31.92 KG/M2 | DIASTOLIC BLOOD PRESSURE: 99 MMHG

## 2021-01-08 DIAGNOSIS — H10.11 ACUTE ATOPIC CONJUNCTIVITIS OF RIGHT EYE: Primary | ICD-10-CM

## 2021-01-08 PROCEDURE — 2500000003 HC RX 250 WO HCPCS: Performed by: NURSE PRACTITIONER

## 2021-01-08 PROCEDURE — 99213 OFFICE O/P EST LOW 20 MIN: CPT

## 2021-01-08 PROCEDURE — 99214 OFFICE O/P EST MOD 30 MIN: CPT | Performed by: NURSE PRACTITIONER

## 2021-01-08 RX ORDER — ERYTHROMYCIN 5 MG/G
0.5 OINTMENT OPHTHALMIC EVERY 6 HOURS
Qty: 1 TUBE | Refills: 0 | Status: SHIPPED | OUTPATIENT
Start: 2021-01-08 | End: 2021-01-15

## 2021-01-08 RX ORDER — PROPARACAINE HYDROCHLORIDE 5 MG/ML
2 SOLUTION/ DROPS OPHTHALMIC ONCE
Status: COMPLETED | OUTPATIENT
Start: 2021-01-08 | End: 2021-01-08

## 2021-01-08 RX ORDER — SULFACETAMIDE SODIUM 100 MG/ML
1 SOLUTION/ DROPS OPHTHALMIC 4 TIMES DAILY
Qty: 5 ML | Refills: 0 | Status: SHIPPED | OUTPATIENT
Start: 2021-01-08 | End: 2021-04-01 | Stop reason: ALTCHOICE

## 2021-01-08 RX ADMIN — PROPARACAINE HYDROCHLORIDE 2 DROP: 5 SOLUTION/ DROPS OPHTHALMIC at 12:22

## 2021-01-08 ASSESSMENT — ENCOUNTER SYMPTOMS
EYE ITCHING: 0
BLURRED VISION: 1
SINUS PAIN: 0
COUGH: 0
SHORTNESS OF BREATH: 0
CHOKING: 0
EYE WATERING: 0
SINUS PRESSURE: 0
STRIDOR: 0
APNEA: 0
PERI-ORBITAL EDEMA: 0
SORE THROAT: 0
BLIND SPOTS: 0
NAUSEA: 0
PHOTOPHOBIA: 1
CRUSTING: 1
EYE REDNESS: 1
VOMITING: 0
EYE INFLAMMATION: 0
EYE DISCHARGE: 0
EYE PAIN: 1
WHEEZING: 0
DOUBLE VISION: 0
CHEST TIGHTNESS: 0
RHINORRHEA: 0

## 2021-01-08 ASSESSMENT — PAIN - FUNCTIONAL ASSESSMENT: PAIN_FUNCTIONAL_ASSESSMENT: PREVENTS OR INTERFERES SOME ACTIVE ACTIVITIES AND ADLS

## 2021-01-08 ASSESSMENT — PAIN DESCRIPTION - ORIENTATION: ORIENTATION: RIGHT

## 2021-01-08 ASSESSMENT — VISUAL ACUITY: OU: 1

## 2021-01-08 ASSESSMENT — PAIN DESCRIPTION - PAIN TYPE: TYPE: ACUTE PAIN

## 2021-01-08 NOTE — ED PROVIDER NOTES
[facility]  Urgent Care Encounter      CHIEF COMPLAINT       Chief Complaint   Patient presents with    Eye Problem     right       Nurses Notes reviewed and I agree except as noted in the HPI. HISTORY OFPRESENT ILLNESS   Arn East is a 45 y.o. The history is provided by the patient. No  was used. Eye Problem  Location:  Right eye  Quality:  Stinging  Severity:  Severe  Onset quality:  Sudden  Duration:  2 hours  Timing:  Constant  Progression:  Unchanged  Chronicity:  New  Context: contact lenses    Context: not burn, not chemical exposure, not direct trauma, not foreign body, not using machinery, not scratch, not smoke exposure and not UV exposure    Relieved by:  Nothing  Worsened by:  Contact and eye movement  Ineffective treatments:  Flushing  Associated symptoms: blurred vision, crusting, photophobia and redness    Associated symptoms: no decreased vision, no discharge, no double vision, no facial rash, no headaches, no inflammation, no itching, no nausea, no numbness, no scotomas, no swelling, no tearing, no tingling, no vomiting and no weakness    Risk factors: no conjunctival hemorrhage, no exposure to pinkeye, no previous injury to eye, no recent herpes zoster and no recent URI    Risk factors comment:  30 day contact in use, reports they have been in for over a month. REVIEW OF SYSTEMS     Review of Systems   Constitutional: Negative for activity change, appetite change, chills, diaphoresis, fatigue, fever and unexpected weight change. HENT: Negative for congestion, ear pain, rhinorrhea, sinus pressure, sinus pain, sneezing and sore throat. Eyes: Positive for blurred vision, photophobia, pain, redness and visual disturbance. Negative for double vision, discharge and itching. Respiratory: Negative for apnea, cough, choking, chest tightness, shortness of breath, wheezing and stridor.     Cardiovascular: Negative for chest pain, palpitations and leg swelling. Gastrointestinal: Negative for nausea and vomiting. Neurological: Negative for tingling, weakness, numbness and headaches. PAST MEDICAL HISTORY         Diagnosis Date    Anxiety     CAD (coronary artery disease)     Chronic back pain     Depression     Headache(784.0)     Hypertension     TBI (traumatic brain injury) ([facility] [address]     Unspecified sleep apnea        SURGICAL HISTORY     Patient  has a past surgical history that includes tracheostomy closure; Tracheotomy; and Plantar fascia surgery (Left, 7/10/2020). CURRENT MEDICATIONS       Discharge Medication List as of 1/8/2021  1:00 PM      CONTINUE these medications which have NOT CHANGED    Details   DULoxetine (CYMBALTA) 60 MG extended release capsule Take 60 mg by mouth dailyHistorical Med      QUEtiapine (SEROQUEL) 25 MG tablet Take 25 mg by mouth nightlyHistorical Med      acetaminophen (TYLENOL ARTHRITIS PAIN) 650 MG CR tablet Take 650 mg by mouth 2 times daily. rivaroxaban (XARELTO) 10 MG TABS tablet Take 1 tablet by mouth daily (with breakfast) for 14 days, Disp-14 tablet, R-0Print      naproxen (NAPROSYN) 500 MG tablet Take 1 tablet by mouth 2 times daily for 10 days, Disp-20 tablet, R-0Print      LORazepam (ATIVAN) 0.5 MG tablet Take 0.5 mg by mouth every 6 hours as needed for Anxiety. Historical Med             ALLERGIES     Patient is is allergic to robitussin [guaifenesin]. FAMILY HISTORY     Patient's family history includes Diabetes in his mother; High Blood Pressure in his father. SOCIAL HISTORY     Patient  reports that he has been smoking cigarettes. He started smoking about 7 years ago. He has been smoking about 0.50 packs per day. He has never used smokeless tobacco. He reports that he does not drink alcohol or use drugs. PHYSICAL EXAM     ED TRIAGE VITALS  BP: (!) 149/99, Temp: 96.6 °F (35.9 °C), Pulse: 88, Resp: 16, SpO2: 97 %  Physical Exam  Vitals signs and nursing note reviewed. Constitutional:       General: He is not in acute distress. Appearance: Normal appearance. He is not ill-appearing, toxic-appearing or diaphoretic. HENT:      Head: Normocephalic and atraumatic. Right Ear: External ear normal.      Left Ear: External ear normal.   Eyes:      General: Lids are normal. Lids are everted, no foreign bodies appreciated. Vision grossly intact. Gaze aligned appropriately. No allergic shiner, visual field deficit or scleral icterus. Right eye: Discharge present. No foreign body or hordeolum. Left eye: No foreign body, discharge or hordeolum. Extraocular Movements: Extraocular movements intact. Right eye: Normal extraocular motion and no nystagmus. Conjunctiva/sclera:      Right eye: Right conjunctiva is injected. No chemosis, exudate or hemorrhage. Pupils: Pupils are equal, round, and reactive to light. Comments: Contacts are very thin for monthly wear. Light blue tint. Contact appears to be stuck on the cornea and iris. Alcaine used for pain control flushed with 2 mL saline no contact noted. Attempting to flush for hydration and removal.  100 mls 0.9 NS flushed, no contact noted. Sweetie Skaggs was able to remove the left contact himself and replace without difficulty. Neck:      Musculoskeletal: Normal range of motion. Pulmonary:      Effort: Pulmonary effort is normal.   Musculoskeletal: Normal range of motion. Skin:     General: Skin is warm. Neurological:      General: No focal deficit present. Mental Status: He is alert and oriented to person, place, and time. Psychiatric:         Mood and Affect: Mood normal.         Behavior: Behavior normal.         Thought Content: Thought content normal.         Judgment: Judgment normal.         DIAGNOSTIC RESULTS   Labs:No results found for this visit on 01/08/21.     IMAGING:  No orders to display     URGENT CARE COURSE:     Vitals:    01/08/21 1207   BP: (!) 149/99   Pulse: 88 Resp: 16   Temp: 96.6 °F (35.9 °C)   TempSrc: Temporal   SpO2: 97%   Weight: 228 lb (103.4 kg)   Height: 5' 11\" (1.803 m)       Medications   proparacaine (ALCAINE) 0.5 % ophthalmic solution 2 drop (2 drops Right Eye Given 1/8/21 1222)     PROCEDURES:  None  FINAL IMPRESSION      1. Acute atopic conjunctivitis of right eye        DISPOSITION/PLAN     Wash hands good  Wipe eyes from nose to ear  Monitor for any increase in redness, pain or drainage  Monitor any visual changes  No Contacts x 1 week if patient wear contacts  Follow up with PCP x 48 - 72 hours if no better.        PATIENT REFERRED TO:  Radha Heck [address]  [address] [facility] [address]  [phone]      As needed    [facility], [address] [address] [phone]    Schedule an appointment as soon as possible for a visit       DISCHARGE MEDICATIONS:  Discharge Medication List as of 1/8/2021  1:00 PM      START taking these medications    Details   erythromycin (ROMYCIN) 5 MG/GM ophthalmic ointment Place 0.5 cm into the right eye every 6 hours for 7 days, Right Eye, EVERY 6 HOURS Starting Fri 1/8/2021, Until Fri 1/15/2021, For 7 days, Disp-1 Tube, R-0, Normal      sulfacetamide (BLEPH-10) 10 % ophthalmic solution Place 1 drop into the right eye 4 times daily, Disp-5 mL, R-0Normal           Discharge Medication List as of 1/8/2021  1:00 PM          Ferris Osgood, APRN - CNP Ferris Osgood, APRN - CNP  01/08/21 [address] [facility], APRN - CNP  01/08/21 [id number]

## 2021-01-08 NOTE — ED TRIAGE NOTES
Patient ambulated to room with cane and complaint of irritation in right eye.  States he wears contacts and woke this morning with his contact in back of eye and not able to remove it

## 2021-01-29 ENCOUNTER — HOSPITAL ENCOUNTER (EMERGENCY)
Age: 41
Discharge: HOME | End: 2021-01-29
Payer: MEDICARE

## 2021-01-29 VITALS
TEMPERATURE: 98.6 F | RESPIRATION RATE: 18 BRPM | OXYGEN SATURATION: 100 % | DIASTOLIC BLOOD PRESSURE: 112 MMHG | SYSTOLIC BLOOD PRESSURE: 164 MMHG | HEART RATE: 70 BPM

## 2021-01-29 DIAGNOSIS — S05.02XA ABRASION OF LEFT CORNEA, INITIAL ENCOUNTER: Primary | ICD-10-CM

## 2021-01-29 PROCEDURE — 6370000000 HC RX 637 (ALT 250 FOR IP): Performed by: PHYSICIAN ASSISTANT

## 2021-01-29 PROCEDURE — 99282 EMERGENCY DEPT VISIT SF MDM: CPT

## 2021-01-29 RX ORDER — SULFACETAMIDE SODIUM 100 MG/ML
1 SOLUTION/ DROPS OPHTHALMIC ONCE
Status: COMPLETED | OUTPATIENT
Start: 2021-01-29 | End: 2021-01-29

## 2021-01-29 RX ADMIN — SULFACETAMIDE SODIUM 1 DROP: 100 SOLUTION/ DROPS OPHTHALMIC at 18:08

## 2021-01-29 ASSESSMENT — PAIN DESCRIPTION - LOCATION: LOCATION: EYE

## 2021-01-29 NOTE — ED TRIAGE NOTES
Pt presents to the ED for left eye redness that started today. Pt states that he has been trapping stray cats at him house and is unsure if that could be the cause.  Pt states that he used eye drops this morning with some relief

## 2021-01-31 ASSESSMENT — ENCOUNTER SYMPTOMS
RHINORRHEA: 0
SORE THROAT: 0
SHORTNESS OF BREATH: 0
PHOTOPHOBIA: 0
EYE PAIN: 1
VOMITING: 0
NAUSEA: 0
COUGH: 0
EYE DISCHARGE: 0
EYE REDNESS: 1
EYE ITCHING: 1

## 2021-01-31 ASSESSMENT — VISUAL ACUITY: OU: 1

## 2021-02-01 NOTE — ED PROVIDER NOTES
[facility] EMERGENCY DEPT      CHIEF COMPLAINT       Chief Complaint   Patient presents with    Conjunctivitis     left       Nurses Notes reviewed and I agree except as noted in the HPI. HISTORY OF PRESENT ILLNESS    Tramaine Brasher is a 45 y.o. male who presents for \"pinkeye. \"  Patient woke up with left eye irritation. It is itching, burning, and feels like there is sand in it. He denies pain or vision changes. He has had this once previously. The patient denies injury or contact with chemicals. Patient wears contacts. Tetanus is up-to-date. The patient denies other complaints. REVIEW OF SYSTEMS     Review of Systems   Constitutional: Negative for chills and fever. HENT: Negative for congestion, ear pain, postnasal drip, rhinorrhea and sore throat. Eyes: Positive for pain, redness and itching. Negative for photophobia, discharge and visual disturbance. Respiratory: Negative for cough and shortness of breath. Cardiovascular: Negative for chest pain. Gastrointestinal: Negative for nausea and vomiting. Musculoskeletal: Negative for gait problem. Skin: Negative for rash. Neurological: Negative for weakness, light-headedness and headaches. Psychiatric/Behavioral: Negative for confusion. PAST MEDICAL HISTORY    has a past medical history of Anxiety, CAD (coronary artery disease), Chronic back pain, Depression, Headache(784.0), Hypertension, TBI (traumatic brain injury) ([facility] [address] and Unspecified sleep apnea. SURGICAL HISTORY      has a past surgical history that includes tracheostomy closure; Tracheotomy; and Plantar fascia surgery (Left, 7/10/2020).     CURRENT MEDICATIONS       Discharge Medication List as of 1/29/2021  6:01 PM      CONTINUE these medications which have NOT CHANGED    Details   sulfacetamide (BLEPH-10) 10 % ophthalmic solution Place 1 drop into the right eye 4 times daily, Disp-5 mL, R-0Normal      rivaroxaban (XARELTO) 10 MG TABS tablet Take 1 tablet by mouth daily (with breakfast) for 14 days, Disp-14 tablet, R-0Print      DULoxetine (CYMBALTA) 60 MG extended release capsule Take 60 mg by mouth dailyHistorical Med      QUEtiapine (SEROQUEL) 25 MG tablet Take 25 mg by mouth nightlyHistorical Med      naproxen (NAPROSYN) 500 MG tablet Take 1 tablet by mouth 2 times daily for 10 days, Disp-20 tablet, R-0Print      LORazepam (ATIVAN) 0.5 MG tablet Take 0.5 mg by mouth every 6 hours as needed for Anxiety. Historical Med      acetaminophen (TYLENOL ARTHRITIS PAIN) 650 MG CR tablet Take 650 mg by mouth 2 times daily. ALLERGIES     is allergic to robitussin [guaifenesin]. FAMILY HISTORY     He indicated that his mother is . He indicated that his father is alive. family history includes Diabetes in his mother; High Blood Pressure in his father. SOCIAL HISTORY    reports that he has been smoking cigarettes. He started smoking about 7 years ago. He has been smoking about 0.50 packs per day. He has never used smokeless tobacco. He reports that he does not drink alcohol or use drugs. PHYSICAL EXAM     INITIAL VITALS:  oral temperature is 98.6 °F (37 °C). His blood pressure is 164/112 (abnormal) and his pulse is 70. His respiration is 18 and oxygen saturation is 100%. Physical Exam  Constitutional:       Appearance: Normal appearance. He is well-developed. He is not ill-appearing. HENT:      Head: Normocephalic and atraumatic. Right Ear: Hearing normal.      Left Ear: Hearing normal.      Nose: Nose normal. No rhinorrhea. Mouth/Throat:      Lips: Pink. Eyes:      General: Lids are normal. Lids are everted, no foreign bodies appreciated. Vision grossly intact. Left eye: No foreign body or discharge. Extraocular Movements: Extraocular movements intact. Conjunctiva/sclera:      Left eye: Left conjunctiva is injected. Pupils: Pupils are equal, round, and reactive to light.       Left eye: Corneal abrasion and fluorescein uptake present. Neck:      Musculoskeletal: Normal range of motion and neck supple. Trachea: Trachea normal.   Cardiovascular:      Heart sounds: No murmur. Pulmonary:      Effort: Pulmonary effort is normal.      Breath sounds: Normal air entry. Abdominal:      General: There is no distension. Musculoskeletal:      Comments: Well perfused; movement normal as observed. Skin:     General: Skin is warm and dry. Findings: No rash. Neurological:      General: No focal deficit present. Mental Status: He is alert. Mental status is at baseline. Motor: Motor function is intact. Gait: Gait normal.   Psychiatric:         Mood and Affect: Mood normal.         Speech: Speech normal.         Behavior: Behavior normal.         DIFFERENTIAL DIAGNOSIS:   Including but not limited to: Corneal abrasion, foreign body, allergic conjunctivitis, viral conjunctivitis    DIAGNOSTIC RESULTS     EKG: All EKG's are interpreted by [facility] who either signs or Co-signs this chart in the absence of a cardiologist.  None    RADIOLOGY: non-plain film images(s) such as CT,Ultrasound and MRI are read by the radiologist.  Plain radiographic images are visualized and preliminarily interpreted by the emergency physician unless otherwise stated below. No orders to display       LABS:   Labs Reviewed - No data to display    EMERGENCY DEPARTMENT COURSE:   Vitals:    Vitals:    01/29/21 1635 01/29/21 1636   BP:  (!) 164/112   Pulse: 70    Resp: 18    Temp: 98.6 °F (37 °C)    TempSrc: Oral    SpO2: 100%        MDM:  The patient was seen and evaluated by me in the intake area. Vital signs were reviewed. Physical exam revealed mild conjunctival injection and corneal abrasion at the 6 o'clock position of the left cornea. No labs or imaging were deemed indicated at this time. I discussed this with the patient and available family and they were agreeable.  Discharge plan was discussed, and patient was comfortable with plan of care. Sulfacetamide ophthalmic drops provided. I have given the patient strict written and verbal instructions about care at home, follow-up, and signs and symptoms of worsening of condition and they did verbalize understanding. CRITICAL CARE:   None    CONSULTS:  None    PROCEDURES:  None    FINAL IMPRESSION      1. Abrasion of left cornea, initial encounter          DISPOSITION/PLAN     1.  Abrasion of left cornea, initial encounter        PATIENT REFERRED TO:  MD Edie Burnett  [address] [place] [address]  [phone]    Schedule an appointment as soon as possible for a visit         DISCHARGE MEDICATIONS:  Discharge Medication List as of 1/29/2021  6:01 PM          (Please note that portions of this note were completed with a voice recognition program.  Efforts were made to edit the dictations but occasionally words are mis-transcribed.)    Hortencia Colon PA-C 01/31/21 8:50 PM    ROSENDA Alegria PA-C  01/31/21 2056

## 2021-03-31 ENCOUNTER — HOSPITAL ENCOUNTER (EMERGENCY)
Age: 41
Discharge: HOME | End: 2021-03-31
Payer: MEDICARE

## 2021-03-31 ENCOUNTER — APPOINTMENT (OUTPATIENT)
Dept: CT IMAGING | Age: 41
End: 2021-03-31
Payer: MEDICARE

## 2021-03-31 VITALS
DIASTOLIC BLOOD PRESSURE: 84 MMHG | TEMPERATURE: 98.5 F | WEIGHT: 230 LBS | OXYGEN SATURATION: 95 % | RESPIRATION RATE: 18 BRPM | SYSTOLIC BLOOD PRESSURE: 128 MMHG | BODY MASS INDEX: 32.2 KG/M2 | HEART RATE: 94 BPM | HEIGHT: 71 IN

## 2021-03-31 DIAGNOSIS — N23 RENAL COLIC: ICD-10-CM

## 2021-03-31 DIAGNOSIS — N20.0 NEPHROLITHIASIS: Primary | ICD-10-CM

## 2021-03-31 LAB
AMORPHOUS: ABNORMAL
ANION GAP SERPL CALCULATED.3IONS-SCNC: 11 MEQ/L (ref 8–16)
BACTERIA: ABNORMAL
BASOPHILS # BLD: 0.5 %
BASOPHILS ABSOLUTE: 0.1 THOU/MM3 (ref 0–0.1)
BILIRUBIN URINE: ABNORMAL
BLOOD, URINE: ABNORMAL
BUN BLDV-MCNC: 17 MG/DL (ref 7–22)
CALCIUM SERPL-MCNC: 9.8 MG/DL (ref 8.5–10.5)
CASTS: ABNORMAL /LPF
CASTS: ABNORMAL /LPF
CHARACTER, URINE: CLEAR
CHLAMYDIA TRACHOMATIS BY RT-PCR: NOT DETECTED
CHLORIDE BLD-SCNC: 101 MEQ/L (ref 98–111)
CO2: 24 MEQ/L (ref 23–33)
COLOR: ABNORMAL
CREAT SERPL-MCNC: 1.1 MG/DL (ref 0.4–1.2)
CRYSTALS: ABNORMAL
CT/NG SOURCE: NORMAL
EOSINOPHIL # BLD: 1.1 %
EOSINOPHILS ABSOLUTE: 0.1 THOU/MM3 (ref 0–0.4)
EPITHELIAL CELLS, UA: ABNORMAL /HPF
ERYTHROCYTE [DISTWIDTH] IN BLOOD BY AUTOMATED COUNT: 12.9 % (ref 11.5–14.5)
ERYTHROCYTE [DISTWIDTH] IN BLOOD BY AUTOMATED COUNT: 41 FL (ref 35–45)
GFR SERPL CREATININE-BSD FRML MDRD: 74 ML/MIN/1.73M2
GLUCOSE BLD-MCNC: 101 MG/DL (ref 70–108)
GLUCOSE, URINE: NEGATIVE MG/DL
HCT VFR BLD CALC: 44.6 % (ref 42–52)
HEMOGLOBIN: 14.3 GM/DL (ref 14–18)
ICTOTEST: NEGATIVE
IMMATURE GRANS (ABS): 0.04 THOU/MM3 (ref 0–0.07)
IMMATURE GRANULOCYTES: 0.3 %
KETONES, URINE: ABNORMAL
LACTIC ACID: 1.6 MMOL/L (ref 0.5–2.2)
LEUKOCYTE ESTERASE, URINE: NEGATIVE
LYMPHOCYTES # BLD: 13 %
LYMPHOCYTES ABSOLUTE: 1.7 THOU/MM3 (ref 1–4.8)
MCH RBC QN AUTO: 28.1 PG (ref 26–33)
MCHC RBC AUTO-ENTMCNC: 32.1 GM/DL (ref 32.2–35.5)
MCV RBC AUTO: 87.8 FL (ref 80–94)
MISCELLANEOUS LAB TEST RESULT: ABNORMAL
MONOCYTES # BLD: 9.5 %
MONOCYTES ABSOLUTE: 1.2 THOU/MM3 (ref 0.4–1.3)
MUCUS: ABNORMAL
NEISSERIA GONORRHOEAE BY RT-PCR: NOT DETECTED
NITRITE, URINE: NEGATIVE
NUCLEATED RED BLOOD CELLS: 0 /100 WBC
OSMOLALITY CALCULATION: 273.6 MOSMOL/KG (ref 275–300)
PH UA: 5 (ref 5–9)
PLATELET # BLD: 282 THOU/MM3 (ref 130–400)
PMV BLD AUTO: 9.5 FL (ref 9.4–12.4)
POTASSIUM SERPL-SCNC: 4.1 MEQ/L (ref 3.5–5.2)
PROTEIN UA: ABNORMAL MG/DL
RBC # BLD: 5.08 MILL/MM3 (ref 4.7–6.1)
RBC URINE: ABNORMAL /HPF
RENAL EPITHELIAL, UA: ABNORMAL
SEG NEUTROPHILS: 75.6 %
SEGMENTED NEUTROPHILS ABSOLUTE COUNT: 9.9 THOU/MM3 (ref 1.8–7.7)
SODIUM BLD-SCNC: 136 MEQ/L (ref 135–145)
SPECIFIC GRAVITY UA: 1.03 (ref 1–1.03)
UROBILINOGEN, URINE: 1 EU/DL (ref 0–1)
WBC # BLD: 13.1 THOU/MM3 (ref 4.8–10.8)
WBC UA: ABNORMAL /HPF
YEAST: ABNORMAL

## 2021-03-31 PROCEDURE — 83605 ASSAY OF LACTIC ACID: CPT

## 2021-03-31 PROCEDURE — 80048 BASIC METABOLIC PNL TOTAL CA: CPT

## 2021-03-31 PROCEDURE — 36415 COLL VENOUS BLD VENIPUNCTURE: CPT

## 2021-03-31 PROCEDURE — 96375 TX/PRO/DX INJ NEW DRUG ADDON: CPT

## 2021-03-31 PROCEDURE — 96374 THER/PROPH/DIAG INJ IV PUSH: CPT

## 2021-03-31 PROCEDURE — 87591 N.GONORRHOEAE DNA AMP PROB: CPT

## 2021-03-31 PROCEDURE — 6360000002 HC RX W HCPCS: Performed by: NURSE PRACTITIONER

## 2021-03-31 PROCEDURE — 99282 EMERGENCY DEPT VISIT SF MDM: CPT

## 2021-03-31 PROCEDURE — 81001 URINALYSIS AUTO W/SCOPE: CPT

## 2021-03-31 PROCEDURE — 87491 CHLMYD TRACH DNA AMP PROBE: CPT

## 2021-03-31 PROCEDURE — 85025 COMPLETE CBC W/AUTO DIFF WBC: CPT

## 2021-03-31 PROCEDURE — 74176 CT ABD & PELVIS W/O CONTRAST: CPT

## 2021-03-31 RX ORDER — KETOROLAC TROMETHAMINE 10 MG/1
10 TABLET, FILM COATED ORAL EVERY 6 HOURS PRN
Qty: 20 TABLET | Refills: 0 | Status: SHIPPED | OUTPATIENT
Start: 2021-03-31

## 2021-03-31 RX ORDER — FENTANYL CITRATE 50 UG/ML
50 INJECTION, SOLUTION INTRAMUSCULAR; INTRAVENOUS ONCE
Status: COMPLETED | OUTPATIENT
Start: 2021-03-31 | End: 2021-03-31

## 2021-03-31 RX ORDER — KETOROLAC TROMETHAMINE 30 MG/ML
30 INJECTION, SOLUTION INTRAMUSCULAR; INTRAVENOUS ONCE
Status: COMPLETED | OUTPATIENT
Start: 2021-03-31 | End: 2021-03-31

## 2021-03-31 RX ADMIN — FENTANYL CITRATE 50 MCG: 50 INJECTION, SOLUTION INTRAMUSCULAR; INTRAVENOUS at 12:17

## 2021-03-31 RX ADMIN — KETOROLAC TROMETHAMINE 30 MG: 30 INJECTION, SOLUTION INTRAMUSCULAR; INTRAVENOUS at 12:17

## 2021-03-31 ASSESSMENT — ENCOUNTER SYMPTOMS
CHEST TIGHTNESS: 0
ABDOMINAL PAIN: 1
COLOR CHANGE: 0
VOMITING: 0
SHORTNESS OF BREATH: 0
COUGH: 0
ABDOMINAL DISTENTION: 0
DIARRHEA: 0
NAUSEA: 0

## 2021-03-31 ASSESSMENT — PAIN SCALES - GENERAL: PAINLEVEL_OUTOF10: 9

## 2021-03-31 ASSESSMENT — PAIN DESCRIPTION - DESCRIPTORS: DESCRIPTORS: STABBING

## 2021-03-31 NOTE — ED PROVIDER NOTES
[facility] Emergency Department    CHIEF COMPLAINT       Chief Complaint   Patient presents with    Groin Pain       Nurses Notes reviewed and I agree except as noted in the HPI. HISTORY OF PRESENT ILLNESS    Ruel Valadez chi 45 y.o. male who presents to the ED for evaluation of left groin pain. Patient states symptoms began suddenly this morning. He denies any inciting event. He notes the pain is in his left testicle and radiates up into his left groin. Notes he has a history of a hernia in the past and it feels similar to this. He denies any change in urination. He does also note a history of kidney stones in the past.  He has a history of anxiety and depression hypertension. He denies fevers chills. Denies nausea or vomiting. Denies change in bowels. HPI was provided by the patient. REVIEW OF SYSTEMS     Review of Systems   Constitutional: Negative for activity change, chills, fatigue and fever. Respiratory: Negative for cough, chest tightness and shortness of breath. Cardiovascular: Negative for chest pain. Gastrointestinal: Positive for abdominal pain. Negative for abdominal distention, diarrhea, nausea and vomiting. Genitourinary: Positive for flank pain and testicular pain. Negative for decreased urine volume, difficulty urinating, dysuria, frequency, penile pain, scrotal swelling and urgency. Skin: Negative for color change and wound. Allergic/Immunologic: Negative for immunocompromised state. Neurological: Negative for dizziness, weakness, light-headedness, numbness and headaches. Hematological: Does not bruise/bleed easily. Psychiatric/Behavioral: Negative for agitation and confusion. The patient is nervous/anxious.          PAST MEDICAL HISTORY     Past Medical History:   Diagnosis Date    Anxiety     CAD (coronary artery disease)     Chronic back pain     Depression     Headache(784.0)     Hypertension     TBI (traumatic brain injury) ([facility] [address]     Unspecified sleep apnea        SURGICALHISTORY      has a past surgical history that includes tracheostomy closure; Tracheotomy; and Plantar fascia surgery (Left, 7/10/2020). CURRENT MEDICATIONS       Discharge Medication List as of 3/31/2021  1:31 PM      CONTINUE these medications which have NOT CHANGED    Details   sulfacetamide (BLEPH-10) 10 % ophthalmic solution Place 1 drop into the right eye 4 times daily, Disp-5 mL, R-0Normal      rivaroxaban (XARELTO) 10 MG TABS tablet Take 1 tablet by mouth daily (with breakfast) for 14 days, Disp-14 tablet, R-0Print      DULoxetine (CYMBALTA) 60 MG extended release capsule Take 60 mg by mouth dailyHistorical Med      QUEtiapine (SEROQUEL) 25 MG tablet Take 25 mg by mouth nightlyHistorical Med      naproxen (NAPROSYN) 500 MG tablet Take 1 tablet by mouth 2 times daily for 10 days, Disp-20 tablet, R-0Print      LORazepam (ATIVAN) 0.5 MG tablet Take 0.5 mg by mouth every 6 hours as needed for Anxiety. Historical Med      acetaminophen (TYLENOL ARTHRITIS PAIN) 650 MG CR tablet Take 650 mg by mouth 2 times daily. ALLERGIES     is allergic to robitussin [guaifenesin]. FAMILY HISTORY     He indicated that his mother is . He indicated that his father is alive. family history includes Diabetes in his mother; High Blood Pressure in his father.     SOCIAL HISTORY       Social History     Socioeconomic History    Marital status:      Spouse name: Not on file    Number of children: Not on file    Years of education: Not on file    Highest education level: Not on file   Occupational History    Not on file   Social Needs    Financial resource strain: Not on file    Food insecurity     Worry: Not on file     Inability: Not on file    Transportation needs     Medical: Not on file     Non-medical: Not on file   Tobacco Use    Smoking status: Current Every Day Smoker     Packs/day: 0.50     Types: Cigarettes     Start date: 2014    Smokeless Palpations: Abdomen is soft. Tenderness: There is abdominal tenderness. There is left CVA tenderness. There is no right CVA tenderness or guarding. Hernia: There is no hernia in the left inguinal area or right inguinal area. Genitourinary:     Penis: Uncircumcised. Testes:         Right: Mass, tenderness, swelling or testicular hydrocele not present. Right testis is descended. Left: Tenderness present. Mass, swelling or testicular hydrocele not present. Left testis is descended. Musculoskeletal: Normal range of motion. Lymphadenopathy:      Lower Body: No right inguinal adenopathy. No left inguinal adenopathy. Skin:     General: Skin is warm and dry. Coloration: Skin is not pale. Findings: No erythema or rash. Neurological:      Mental Status: He is alert and oriented to person, place, and time. Psychiatric:         Behavior: Behavior normal.         Thought Content: Thought content normal.         Judgment: Judgment normal.         DIFFERENTIAL DIAGNOSIS:   Inguinal hernia, nephrolithiasis, pyelonephritis,  DIAGNOSTIC RESULTS       RADIOLOGY: non-plainfilm images(s) such as CT, Ultrasound and MRI are read by the radiologist.  Plain radiographic images are visualized and preliminarily interpreted by the emergency physician unless otherwise stated below. CT ABDOMEN PELVIS WO CONTRAST Additional Contrast? None   Final Result    3 mm renal stone at the left ureterovesicular junction with associated mild hydroureteronephrosis on the left and left moderate perinephric fat stranding. **This report has been created using voice recognition software. It may contain minor errors which are inherent in voice recognition technology. **      Final report electronically signed by Dr. Emma Thomas MD on 3/31/2021 12:21 PM            LABS:   Labs Reviewed   CBC WITH AUTO DIFFERENTIAL - Abnormal; Notable for the following components:       Result Value    WBC 13.1 (*)     MCHC 32.1 (*)     Segs Absolute 9.9 (*)     All other components within normal limits   URINALYSIS WITH MICROSCOPIC - Abnormal; Notable for the following components:    Bilirubin Urine SMALL (*)     Ketones, Urine TRACE (*)     Blood, Urine LARGE (*)     Protein, UA TRACE (*)     All other components within normal limits   GLOMERULAR FILTRATION RATE, ESTIMATED - Abnormal; Notable for the following components:    Est, Glom Filt Rate 74 (*)     All other components within normal limits   OSMOLALITY - Abnormal; Notable for the following components:    Osmolality Calc 273.6 (*)     All other components within normal limits   C. TRACHOMATIS / N. GONORRHOEAE, DNA   BASIC METABOLIC PANEL   LACTIC ACID, PLASMA   ANION GAP   ICTOTEST, URINE       EMERGENCY DEPARTMENT COURSE:   Vitals:    Vitals:    03/31/21 1124 03/31/21 1306   BP: (!) 136/108 128/84   Pulse: 107 94   Resp: 18 18   Temp: 98.5 °F (36.9 °C)    TempSrc: Oral    SpO2: 95% 95%   Weight: 230 lb (104.3 kg)    Height: 5' 11\" (1.803 m)        MDM    Patient was seen and evaluated in the emergency department, patient appeared to be in acute distress initially due to his pain, he can sit down, appear to be having renal colic. Physical exam was completed, left CVA tenderness noted, left testicle pain and groin pain noted. Labs and imaging are ordered, CT shows distal nephrolithiasis likely the cause of his pain, no hernia noted. Labs were obtained, no significant findings noted. Patient was treated medications below, significant provement symptoms noted. I discussed my findings my plan of care with patient he is amenable with discharge. He will be given prescription for Toradol, he is given appointment to follow up with urology tomorrow. Medications   ketorolac (TORADOL) injection 30 mg (30 mg Intravenous Given 3/31/21 1217)   fentaNYL (SUBLIMAZE) injection 50 mcg (50 mcg Intravenous Given 3/31/21 1217)       Patient was seenindependently by myself.  The patient's final impression and disposition and plan was determined by myself. CRITICAL CARE:   None    CONSULTS:  None    PROCEDURES:  None    FINAL IMPRESSION     1. Nephrolithiasis    2. Renal colic          DISPOSITION/PLAN   Patient discharged in stable condition    PATIENT REFERREDTO:  [facility] Urology  [address] [facility],[address]  Go in 1 day  Appointment at [address] [place] [address]:  Discharge Medication List as of 3/31/2021  1:31 PM          (Please note that portions of this note were completed with a voice recognition program.  Efforts were made to edit the dictations but occasionally words are mis-transcribed.)      Provider:  I personally performed the services described in the documentation,reviewed and edited the documentation which was dictated to the scribe in my presence, and it accurately records my words and actions.     Fred Silva CNP 03/31/21 10:12 PM    Jagruti Silva, APRN - CNP        RUNform, APRN - CNP  03/31/21 [id number] [facility] Counts, APRN - CNP  03/31/21 0792

## 2021-03-31 NOTE — ED NOTES
Patient presents to the ED with complaints of groin pain that started this morning. States he had a hernia repair done a couple years ago and it feels the same. States he took something for pain but is unable to say what it was.      Airam Espinoza  03/31/21 1129

## 2021-03-31 NOTE — ED NOTES
Reassessment of the patients Groin Pain   is unchanged, the patients pain reassessment is a 4/10, Side rails up times 2, call light in reach, will continue to monitor.      Amina Houser RN  03/31/21 0840

## 2021-04-01 ENCOUNTER — OFFICE VISIT (OUTPATIENT)
Dept: UROLOGY | Age: 41
End: 2021-04-01
Payer: MEDICARE

## 2021-04-01 ENCOUNTER — OFFICE VISIT (OUTPATIENT)
Dept: UROLOGY | Age: 41
End: 2021-04-01

## 2021-04-01 ENCOUNTER — TELEPHONE (OUTPATIENT)
Dept: UROLOGY | Age: 41
End: 2021-04-01

## 2021-04-01 VITALS — HEIGHT: 71 IN | WEIGHT: 241 LBS | BODY MASS INDEX: 33.74 KG/M2 | TEMPERATURE: 97.2 F

## 2021-04-01 DIAGNOSIS — R10.9 LEFT FLANK PAIN: Primary | ICD-10-CM

## 2021-04-01 DIAGNOSIS — N20.0 KIDNEY STONE: Primary | ICD-10-CM

## 2021-04-01 PROCEDURE — G8417 CALC BMI ABV UP PARAM F/U: HCPCS | Performed by: NURSE PRACTITIONER

## 2021-04-01 PROCEDURE — 99024 POSTOP FOLLOW-UP VISIT: CPT | Performed by: NURSE PRACTITIONER

## 2021-04-01 PROCEDURE — 4004F PT TOBACCO SCREEN RCVD TLK: CPT | Performed by: NURSE PRACTITIONER

## 2021-04-01 PROCEDURE — G8428 CUR MEDS NOT DOCUMENT: HCPCS | Performed by: NURSE PRACTITIONER

## 2021-04-01 PROCEDURE — 99204 OFFICE O/P NEW MOD 45 MIN: CPT | Performed by: NURSE PRACTITIONER

## 2021-04-01 RX ORDER — TAMSULOSIN HYDROCHLORIDE 0.4 MG/1
0.4 CAPSULE ORAL DAILY
Qty: 30 CAPSULE | Refills: 0 | Status: SHIPPED | OUTPATIENT
Start: 2021-04-01

## 2021-04-01 RX ORDER — LAMOTRIGINE 100 MG/1
100 TABLET ORAL DAILY
COMMUNITY

## 2021-04-01 RX ORDER — HYDROXYZINE PAMOATE 25 MG/1
25 CAPSULE ORAL 3 TIMES DAILY PRN
COMMUNITY

## 2021-04-01 RX ORDER — LOSARTAN POTASSIUM 50 MG/1
50 TABLET ORAL DAILY
COMMUNITY

## 2021-04-01 NOTE — PROGRESS NOTES
Ace Vargas is a 45 y.o. male is a new patient who was referred here by [facility] ED. Ace Vargas was seen in the ED on 3- due to left groin pain and left flank pain. The pain started yesterday morning. He thought it was his mesh from his previous hernia. It is located in the left groin and is constant. It is described as a burning sensation. There are no aggravating factors. There is also associated nausea & vomiting. CT abd/pelvis showed 3 mm stone  in left UVJ with mild hydroureteronephrosis. Their pain was controlled so they were discharged with pain medication and Flomax. They deny dysuria, hematuria, fever, or chills.     CRT 1.1, GFR 74  WBC 13.1  UA showed 250-50 RBCs and 5-9 WBCs      no prior hx of stones.      Past Medical History        Past Medical History:   Diagnosis Date    Anxiety      CAD (coronary artery disease)      Chronic back pain      Depression      Headache(784.0)      Hypertension      TBI (traumatic brain injury) ([facility] [address]      Unspecified sleep apnea              Past Surgical History         Past Surgical History:   Procedure Laterality Date    PLANTAR FASCIA SURGERY Left 7/10/2020     LEFT TIBIAL BONE MARROW HARVEST, LEFT TARSAL TUNNEL DECOMPRESSION, INSTEP PLANTAR FASCIOTOMY, POSTERIOR COMPARTMENT FASCIOTOMY, MORTONS NEUROMA performed by Diego Stafford DPM at [address] [place] [address]                       Current Outpatient Medications on File Prior to Visit   Medication Sig Dispense Refill    lamoTRIgine (LAMICTAL) 100 MG tablet Take 100 mg by mouth daily        losartan (COZAAR) 50 MG tablet Take 50 mg by mouth daily        hydrOXYzine (VISTARIL) 25 MG capsule Take 25 mg by mouth 3 times daily as needed for Itching        ketorolac (TORADOL) 10 MG tablet Take 1 tablet by mouth every 6 hours as needed for Pain 20 tablet 0    DULoxetine (CYMBALTA) 60 MG extended release capsule Take 60 mg by mouth daily        QUEtiapine (SEROQUEL) 25 MG tablet Take 25 mg by mouth nightly        rivaroxaban (XARELTO) 10 MG TABS tablet Take 1 tablet by mouth daily (with breakfast) for 14 days 14 tablet 0    naproxen (NAPROSYN) 500 MG tablet Take 1 tablet by mouth 2 times daily for 10 days 20 tablet 0      No current facility-administered medications on file prior to visit.               Allergies   Allergen Reactions    Robitussin [Guaifenesin] Hives         Social History            Tobacco Use    Smoking status: Current Every Day Smoker       Packs/day: 0.50       Types: Cigarettes       Start date: 1/1/2014    Smokeless tobacco: Never Used   Substance Use Topics    Alcohol use: No    Drug use: No         Family History         Family History   Problem Relation Age of Onset    Diabetes Mother      High Blood Pressure Father              Review of Systems   Constitutional: Negative for chills, fatigue, fever or weight loss. Eyes: Denies reported visual changes. Cardiovascular: Negative for chest pain, palpitations, tachycardia or edema. Respiratory: Denies cough or SOB. GI: Denies any constipation or diarrhea. Nausea yesterday  : see HPI. Musculoskeletal: Patient denies low back pain or painful or reduced range of  motion of the back or extremities. Neurological: The patient denies any symptoms of neurological impairment or TIA's; no history of stroke. Lymphatic: Denies swollen glands in neck, axillary or inguinal areas. Psychiatric: Denies anxiety or depression. Skin: Denies rash or lesions. The remainder of the complete ROS is negative.     Physical Exam  Nursing note and vitals reviewed. Constitutional: Alert and oriented times 3, no acute distress and cooperative to examination with appropriate mood and affect. HENT:   Head:        Normocephalic and atraumatic. Mouth/Throat:         Mucous membranes are normal.   Eyes:         EOM are normal. No scleral icterus. PERRLA.    Neck:        Supple, symmetrical, trachea midline, no adenopathy, thyroid symmetric, not enlarged and no tenderness. Cardiovascular:        Normal rate, regular rhythm, S1 S2 heart sounds. No murmurs, rub, or gallops. Pulmonary/Chest:      Chest symmetric with normal A/P diameter,  CTA with no wheezes, rales, or rhonchi noted. Normal respiratory rate and rhthym. No use of accessory muscles. Abdominal:         Soft. No tenderness. No rebound or guarding. No CVA tenderness. Musculoskeletal:         Normal range of motion. No evidence of edema or tenderness of lower extremities. Extremities: No cyanosis, clubbing, or edema present. Neurological:        Alert and oriented. Skin:       Skin color, texture, turgor normal. No rashes or lesions. Psychiatric:        Normal mood and affect.      Labs   WBC:          Lab Results   Component Value Date     WBC 13.1 03/31/2021      Hemoglobin/Hematocrit:    Lab Results   Component Value Date     HGB 14.3 03/31/2021     HCT 44.6 03/31/2021      BMP:          Lab Results   Component Value Date      03/31/2021     K 4.1 03/31/2021      03/31/2021     CO2 24 03/31/2021     BUN 17 03/31/2021     CREATININE 1.1 03/31/2021     CALCIUM 9.8 03/31/2021     LABGLOM 74 03/31/2021      PT/INR:  No results found for: PROTIME, INR  PTT:  No results found for: APTT[APTT  Urinalysis:         Lab Results   Component Value Date     BLOODU LARGE 03/31/2021     NITRU NEGATIVE 03/31/2021     LEUKOCYTESUR NEGATIVE 03/31/2021     COLORU DK YELLOW 03/31/2021     WBCUA 5-9 03/31/2021     BACTERIA FEW 03/31/2021     CRYST OXALATE 03/31/2021      Urine Culture:  No results found for: LABURIN  Blood Culture:  No results found for: [facility]        Radiology  The patient has had a CT Stone Protocol which I have reviewed along with its accompanying report.   The study demonstrates :         Impression    3 mm renal stone at the left ureterovesicular junction with associated mild hydroureteronephrosis on the left and left moderate perinephric fat stranding.                     Assessment     Left flank pain  3 mm stone at the left UVJ  Mild left hydroureteronephrosis        Plan     Discussed stone management. Discussed spontaneous passage vs ureteroscopy. His symptoms are controlled. Hasn't needed a pain pill since this am. No fever or chills. He would like to try to pass stone on his own at this time. Flomax sent in to pharmacy. He will push water, and avoid energy drinks. Handout given to patient for stone prevention. He was advised to go to ER for fever, chills.     Fu in 2 weeks with KUB and Renal US       Araceli Cobb CNP  4/1/2021 1:39 PM

## 2021-04-01 NOTE — PATIENT INSTRUCTIONS
KIDNEY STONE PREVENTION  After you have a kidney stone attack, you should have blood and urine tests to determine whether you have certain health problems or dietary issues that increase the risk of kidney stones (table 1). If you passed and saved the stone, it should be analyzed to determine the type of stone. In addition, your clinician may request that you perform a 24-hour urine collection (all the urine you make over a 24-hour period) to determine underlying risk factors for your kidney stone disease. (See \"Patient education: Collection of a 24-hour urine specimen (Beyond the Basics)\". )  Based upon these test results, one or more of the following may be recommended [1]:  ?You may be advised to drink more fluids to decrease the risk of another stone. The goal is to increase the amount of urine that flows through your kidneys and also to lower the concentrations of substances that promote stone formation. Experts recommend drinking enough fluid that you make more than 2 liters of urine per day. ? You may be advised to make changes in your diet; the changes recommended will depend upon the type of kidney stone you have and the 24-hour urine results. ?You may be advised to take a medication to reduce the risk of future stones.

## 2021-04-01 NOTE — TELEPHONE ENCOUNTER
Patient scheduled for US Renal Complete  at [facility] MR on 4/7/21 arrival of 3:45 pm for a 4:00 pm scan time with no carbonated beverages the day of and arrive well hydrated. Patient advised of instructions.   Order mailed/given to patient

## 2021-06-02 ENCOUNTER — HOSPITAL ENCOUNTER (EMERGENCY)
Age: 41
Discharge: HOME | End: 2021-06-02
Attending: EMERGENCY MEDICINE
Payer: MEDICARE

## 2021-06-02 VITALS
SYSTOLIC BLOOD PRESSURE: 164 MMHG | BODY MASS INDEX: 32.08 KG/M2 | HEART RATE: 86 BPM | WEIGHT: 230 LBS | RESPIRATION RATE: 18 BRPM | TEMPERATURE: 98.1 F | OXYGEN SATURATION: 98 % | DIASTOLIC BLOOD PRESSURE: 95 MMHG

## 2021-06-02 DIAGNOSIS — H10.31 ACUTE BACTERIAL CONJUNCTIVITIS OF RIGHT EYE: Primary | ICD-10-CM

## 2021-06-02 PROCEDURE — 99283 EMERGENCY DEPT VISIT LOW MDM: CPT

## 2021-06-02 PROCEDURE — 6370000000 HC RX 637 (ALT 250 FOR IP): Performed by: EMERGENCY MEDICINE

## 2021-06-02 RX ORDER — CIPROFLOXACIN HYDROCHLORIDE 3.5 MG/ML
1 SOLUTION/ DROPS TOPICAL
Qty: 10 ML | Refills: 0 | Status: SHIPPED | OUTPATIENT
Start: 2021-06-02 | End: 2021-06-12

## 2021-06-02 RX ORDER — TETRACAINE HYDROCHLORIDE 5 MG/ML
1 SOLUTION OPHTHALMIC ONCE
Status: COMPLETED | OUTPATIENT
Start: 2021-06-02 | End: 2021-06-02

## 2021-06-02 RX ADMIN — TETRACAINE HYDROCHLORIDE 1 DROP: 5 SOLUTION OPHTHALMIC at 19:24

## 2021-06-02 RX ADMIN — FLUORESCEIN SODIUM 1 MG: 1 STRIP OPHTHALMIC at 19:22

## 2021-06-02 ASSESSMENT — ENCOUNTER SYMPTOMS
EYE ITCHING: 0
CONSTIPATION: 0
VOMITING: 0
SHORTNESS OF BREATH: 0
ABDOMINAL PAIN: 0
CHEST TIGHTNESS: 0
WHEEZING: 0
COUGH: 0
STRIDOR: 0
DIARRHEA: 0
ABDOMINAL DISTENTION: 0
BACK PAIN: 0
NAUSEA: 0
RHINORRHEA: 0
SORE THROAT: 0

## 2021-06-02 ASSESSMENT — VISUAL ACUITY
OS: 20/30
OD: 20/70
OU: 20/25

## 2021-06-02 ASSESSMENT — PAIN SCALES - GENERAL: PAINLEVEL_OUTOF10: 4

## 2021-06-02 NOTE — ED PROVIDER NOTES
[address] ENCOUNTER      PATIENT NAME: Tyler Sepulveda  MRN: [id number]  : 1980  SHOEMAKER: 2021  PROVIDER: Jennifer Yu MD      CHIEF COMPLAINT       Chief Complaint   Patient presents with    Eye Pain     right       Patient is seen and evaluated in a timely fashion. Nurses Notes are reviewed and I agree except as noted in the HPI. HISTORY OF PRESENT ILLNESS    Tyler Sepulveda is a 45 y.o. male who presents to Emergency Department with Eye Pain (right)    71-year-old male presents to ED complaining of right eye pain or redness. Patient wears contact lens, he usually sleeps with contact lens in. Even though he has severe pain to right eye with injection, he did not take his contact lens out because he could not see. This HPI was provided by patient. REVIEW OF SYSTEMS   Review of Systems   Constitutional: Negative for activity change, appetite change, chills, fatigue, fever and unexpected weight change. HENT: Negative for congestion, ear discharge, ear pain, hearing loss, nosebleeds, rhinorrhea and sore throat. Eyes: Positive for photophobia, pain, discharge, redness and visual disturbance. Negative for itching. Respiratory: Negative for cough, chest tightness, shortness of breath, wheezing and stridor. Cardiovascular: Negative for chest pain, palpitations and leg swelling. Gastrointestinal: Negative for abdominal distention, abdominal pain, constipation, diarrhea, nausea and vomiting. Endocrine: Negative for cold intolerance, heat intolerance, polydipsia and polyphagia. Genitourinary: Negative for dysuria, flank pain, frequency and hematuria. Musculoskeletal: Negative for arthralgias, back pain, gait problem, myalgias, neck pain and neck stiffness. Skin: Negative for pallor, rash and wound. Allergic/Immunologic: Negative for environmental allergies and food allergies.    Neurological: Negative for dizziness, tremors, syncope, deformity. Cervical back: Normal range of motion and neck supple. Lymphadenopathy:      Cervical: No cervical adenopathy. Skin:     General: Skin is warm and dry. Capillary Refill: Capillary refill takes less than 2 seconds. Coloration: Skin is not pale. Findings: No erythema or rash. Neurological:      Mental Status: He is alert and oriented to person, place, and time. Cranial Nerves: No cranial nerve deficit. Sensory: No sensory deficit. Motor: No abnormal muscle tone. Coordination: Coordination normal.      Deep Tendon Reflexes: Reflexes normal.   Psychiatric:         Behavior: Behavior normal.         Thought Content: Thought content normal.         Judgment: Judgment normal.         ANCILLARY TEST RESULTS   EKG: Interpreted by me  Not indicated    LAB RESULTS:  No results found for this visit on 06/02/21. RADIOLOGY REPORTS  No orders to display       MEDICAL DEDISION MAKINGS AND RATIONALES     Differential diagnsis: Corneal abrasion, corneal ulcer, conjunctivitis, iritis    Actions: Eye exam    ED Vitals:  Vitals:    06/02/21 1725 06/02/21 1727   BP:  (!) 164/95   Pulse: 86    Resp: 18    Temp: 98.1 °F (36.7 °C)    TempSrc: Oral    SpO2: 98%    Weight: 230 lb (104.3 kg)      Eye exam suggest2 the patient has iritis versus conjunctivitis, cornea is smooth. I suggested patient should not wear contact lenses until he is seen and cleared by ophthalmologist.  He is prescribed Ciloxan eyedrop. Ophthalmology follow-up in 48 hours. CRITICAL CARE   None    CONSULTS   None    PROCEDURES   None    FINAL IMPRESSION AND DISPOSITION      1.  Acute bacterial conjunctivitis of right eye        Home    PATIENT REFERRED TO:  Betty Lindsey, [address] [address]  [phone]    In 2 days  ED discharge follow up      Lalit Rios:  Discharge Medication List as of 6/2/2021  7:10 PM      START taking these medications    Details   ciprofloxacin (CILOXAN) 0.3 % ophthalmic solution Place 1 drop into the right eye every 2 hours for 10 days, Disp-10 mL, R-0Normal             (Please note that portions of this note were completed with a voice recognition program.  Efforts were made to edit the dictations but occasionally words aremis-transcribed.)    MD Kathy Arias MD  06/03/21 1549       Kathy Caraballo MD  06/13/21 8211

## 2021-06-02 NOTE — ED TRIAGE NOTES
Pt to the ED with c/o right eye pain. Pt  States he woke up with the eye pain and irritation. Pt eye is red.

## 2021-06-02 NOTE — LETTER
[facility] Emergency Department   [place], [address]          PROOF OF PRESENCE      To Whom It May Concern:    Airam Hobbs was present in the Emergency Department at [facility], operated by [facility] Emergency Department on 6/2/2021.                                      Sincerely,

## 2021-06-03 ENCOUNTER — HOSPITAL ENCOUNTER (EMERGENCY)
Age: 41
Discharge: HOME | End: 2021-06-03
Payer: MEDICARE

## 2021-06-03 VITALS
OXYGEN SATURATION: 97 % | RESPIRATION RATE: 16 BRPM | HEART RATE: 92 BPM | BODY MASS INDEX: 32.2 KG/M2 | DIASTOLIC BLOOD PRESSURE: 104 MMHG | TEMPERATURE: 98.1 F | WEIGHT: 230 LBS | SYSTOLIC BLOOD PRESSURE: 143 MMHG | HEIGHT: 71 IN

## 2021-06-03 DIAGNOSIS — H10.9 CONJUNCTIVITIS OF RIGHT EYE, UNSPECIFIED CONJUNCTIVITIS TYPE: Primary | ICD-10-CM

## 2021-06-03 PROCEDURE — 99282 EMERGENCY DEPT VISIT SF MDM: CPT

## 2021-06-03 RX ORDER — PROPARACAINE HYDROCHLORIDE 5 MG/ML
SOLUTION/ DROPS OPHTHALMIC
Status: DISCONTINUED
Start: 2021-06-03 | End: 2021-06-03 | Stop reason: HOSPADM

## 2021-06-03 RX ORDER — CIPROFLOXACIN HYDROCHLORIDE 3.5 MG/ML
1 SOLUTION/ DROPS TOPICAL
Status: DISCONTINUED | OUTPATIENT
Start: 2021-06-03 | End: 2021-06-03

## 2021-06-03 ASSESSMENT — ENCOUNTER SYMPTOMS
BLOOD IN STOOL: 0
CONSTIPATION: 0
EYE PAIN: 1
ABDOMINAL DISTENTION: 0
PHOTOPHOBIA: 1
DIARRHEA: 0
NAUSEA: 0
ABDOMINAL PAIN: 0
EYE REDNESS: 1
EYE PAIN: 0
SINUS PRESSURE: 0
SHORTNESS OF BREATH: 0
EYE DISCHARGE: 1
SORE THROAT: 0
SINUS PAIN: 0
COUGH: 0
VOMITING: 0

## 2021-06-03 ASSESSMENT — PAIN SCALES - GENERAL: PAINLEVEL_OUTOF10: 5

## 2021-06-03 ASSESSMENT — PAIN DESCRIPTION - DESCRIPTORS: DESCRIPTORS: ITCHING;PRESSURE

## 2021-06-03 ASSESSMENT — VISUAL ACUITY
OU: 20/25
OD: 20/200
OS: 20/25

## 2021-06-03 ASSESSMENT — PAIN DESCRIPTION - ORIENTATION: ORIENTATION: RIGHT

## 2021-06-03 ASSESSMENT — PAIN DESCRIPTION - LOCATION: LOCATION: EYE

## 2021-06-03 ASSESSMENT — PAIN DESCRIPTION - FREQUENCY: FREQUENCY: CONTINUOUS

## 2021-06-03 ASSESSMENT — PAIN DESCRIPTION - PAIN TYPE: TYPE: ACUTE PAIN

## 2021-06-03 NOTE — ED PROVIDER NOTES
[address] COMPLAINT       Chief Complaint   Patient presents with    Eye Problem     right eye pressure       Nurses Notes reviewed and I agree except as notedin the HPI. HISTORY OF PRESENT ILLNESS    Raul Croft is a 45 y.o. male who presents complains of right-sided eye pain has been going on for couple days. The patient was seen and evaluated here last night and was diagnosed with conjunctivitis. He was gone to be taken off of work however he started a new job and he states he can do that. He had a note to return to work. He states that he wears contacts and has a contact lens out of his right eye. He states that he cannot see out of his right eye. He states that this is not unusual when he is not wearing his contact in either O. He does have pain when he tries to put his contact on that side. Location/Symptom: Right eye pain  Timing/Onset: 2 days  Context/Setting: home  Quality: burns  Duration: constant  Modifying Factors: none  Severity: 10    REVIEW OF SYSTEMS     Review of Systems   Constitutional: Negative for chills and fever. HENT: Negative for congestion, ear pain, sinus pressure, sinus pain and sore throat. Right eye pain     Eyes: Negative for pain. Respiratory: Negative for cough and shortness of breath. Cardiovascular: Negative for chest pain and leg swelling. Gastrointestinal: Negative for abdominal distention, abdominal pain, blood in stool, constipation, diarrhea, nausea and vomiting. Genitourinary: Negative for difficulty urinating, frequency and hematuria. Musculoskeletal: Negative for arthralgias. Skin: Negative for rash. Neurological: Negative for dizziness and headaches. All other systems reviewed and are negative.        PAST MEDICAL HISTORY    has a past medical history of Anxiety, CAD (coronary artery disease), Chronic back pain, Depression, Headache(784.0), Hypertension, TBI (traumatic brain injury) ([facility] [address] and Unspecified sleep apnea. SURGICAL HISTORY      has a past surgical history that includes tracheostomy closure; Tracheotomy; and Plantar fascia surgery (Left, 7/10/2020). CURRENT MEDICATIONS       Discharge Medication List as of 6/3/2021 10:53 AM      CONTINUE these medications which have NOT CHANGED    Details   ciprofloxacin (CILOXAN) 0.3 % ophthalmic solution Place 1 drop into the right eye every 2 hours for 10 days, Disp-10 mL, R-0Normal      lamoTRIgine (LAMICTAL) 100 MG tablet Take 100 mg by mouth dailyHistorical Med      losartan (COZAAR) 50 MG tablet Take 50 mg by mouth dailyHistorical Med      hydrOXYzine (VISTARIL) 25 MG capsule Take 25 mg by mouth 3 times daily as needed for ItchingHistorical Med      tamsulosin (FLOMAX) 0.4 MG capsule Take 1 capsule by mouth daily, Disp-30 capsule, R-0Normal      ketorolac (TORADOL) 10 MG tablet Take 1 tablet by mouth every 6 hours as needed for Pain, Disp-20 tablet, R-0Print      rivaroxaban (XARELTO) 10 MG TABS tablet Take 1 tablet by mouth daily (with breakfast) for 14 days, Disp-14 tablet, R-0Print      DULoxetine (CYMBALTA) 60 MG extended release capsule Take 60 mg by mouth dailyHistorical Med      QUEtiapine (SEROQUEL) 25 MG tablet Take 25 mg by mouth nightlyHistorical Med      naproxen (NAPROSYN) 500 MG tablet Take 1 tablet by mouth 2 times daily for 10 days, Disp-20 tablet, R-0Print             ALLERGIES     is allergic to robitussin [guaifenesin]. HISTORY     He indicated that his mother is . He indicated that his father is alive. family history includes Diabetes in his mother; High Blood Pressure in his father. SOCIALHISTORY      reports that he has been smoking cigarettes. He started smoking about 7 years ago. He has been smoking about 0.50 packs per day. He has never used smokeless tobacco. He reports that he does not drink alcohol and does not use drugs.     PHYSICAL EXAM     INITIAL VITALS:  height is CARE:  None    CONSULTS:  None    PROCEDURES:  None    FINAL IMPRESSION      1.  Conjunctivitis of right eye, unspecified conjunctivitis type          DISPOSITION/PLAN   Discharge    PATIENT REFERRED TO:  Maritza Tubbs, [address] [phone]    In 1 day        DISCHARGE MEDICATIONS:  Discharge Medication List as of 6/3/2021 10:53 AM          (Please note that portions of this note were completed with a voice recognitionprogram.  Efforts were made to edit the dictations but occasionally words are mis-transcribed.)    Devon Mancuso, [address] [place] [address] [address]  06/03/21 [address]  06/03/21 7003

## 2021-07-10 ENCOUNTER — HOSPITAL ENCOUNTER (EMERGENCY)
Age: 41
Discharge: HOME | End: 2021-07-11
Attending: EMERGENCY MEDICINE
Payer: MEDICARE

## 2021-07-10 ENCOUNTER — APPOINTMENT (OUTPATIENT)
Dept: GENERAL RADIOLOGY | Age: 41
End: 2021-07-10
Payer: MEDICARE

## 2021-07-10 ENCOUNTER — HOSPITAL ENCOUNTER (EMERGENCY)
Age: 41
Discharge: HOME | End: 2021-07-10
Attending: INTERNAL MEDICINE
Payer: MEDICARE

## 2021-07-10 ENCOUNTER — APPOINTMENT (OUTPATIENT)
Dept: CT IMAGING | Age: 41
End: 2021-07-10
Payer: MEDICARE

## 2021-07-10 VITALS
DIASTOLIC BLOOD PRESSURE: 116 MMHG | TEMPERATURE: 98.7 F | RESPIRATION RATE: 18 BRPM | SYSTOLIC BLOOD PRESSURE: 180 MMHG | BODY MASS INDEX: 32.2 KG/M2 | HEART RATE: 89 BPM | OXYGEN SATURATION: 96 % | WEIGHT: 230 LBS | HEIGHT: 71 IN

## 2021-07-10 DIAGNOSIS — K59.00 CONSTIPATION, UNSPECIFIED CONSTIPATION TYPE: Primary | ICD-10-CM

## 2021-07-10 LAB
ALBUMIN SERPL-MCNC: 4.6 G/DL (ref 3.5–5.1)
ALP BLD-CCNC: 83 U/L (ref 38–126)
ALT SERPL-CCNC: 44 U/L (ref 11–66)
ANION GAP SERPL CALCULATED.3IONS-SCNC: 14 MEQ/L (ref 8–16)
AST SERPL-CCNC: 31 U/L (ref 5–40)
BACTERIA: ABNORMAL /HPF
BASOPHILS # BLD: 0.6 %
BASOPHILS ABSOLUTE: 0 THOU/MM3 (ref 0–0.1)
BILIRUB SERPL-MCNC: 0.3 MG/DL (ref 0.3–1.2)
BILIRUBIN URINE: ABNORMAL
BLOOD, URINE: NEGATIVE
BUN BLDV-MCNC: 11 MG/DL (ref 7–22)
CALCIUM SERPL-MCNC: 9.6 MG/DL (ref 8.5–10.5)
CASTS 2: ABNORMAL /LPF
CASTS UA: ABNORMAL /LPF
CHARACTER, URINE: CLEAR
CHLORIDE BLD-SCNC: 105 MEQ/L (ref 98–111)
CO2: 21 MEQ/L (ref 23–33)
COLOR: ABNORMAL
CREAT SERPL-MCNC: 0.9 MG/DL (ref 0.4–1.2)
CRYSTALS, UA: ABNORMAL
D-DIMER QUANTITATIVE: < 215 NG/ML FEU (ref 0–500)
EOSINOPHIL # BLD: 1.6 %
EOSINOPHILS ABSOLUTE: 0.1 THOU/MM3 (ref 0–0.4)
EPITHELIAL CELLS, UA: ABNORMAL /HPF
ERYTHROCYTE [DISTWIDTH] IN BLOOD BY AUTOMATED COUNT: 12.6 % (ref 11.5–14.5)
ERYTHROCYTE [DISTWIDTH] IN BLOOD BY AUTOMATED COUNT: 38.9 FL (ref 35–45)
GFR SERPL CREATININE-BSD FRML MDRD: > 90 ML/MIN/1.73M2
GLUCOSE BLD-MCNC: 124 MG/DL (ref 70–108)
GLUCOSE URINE: NEGATIVE MG/DL
HCT VFR BLD CALC: 44 % (ref 42–52)
HEMOGLOBIN: 14.5 GM/DL (ref 14–18)
ICTOTEST: NEGATIVE
IMMATURE GRANS (ABS): 0.01 THOU/MM3 (ref 0–0.07)
IMMATURE GRANULOCYTES: 0.1 %
KETONES, URINE: ABNORMAL
LEUKOCYTE ESTERASE, URINE: NEGATIVE
LIPASE: 40.7 U/L (ref 5.6–51.3)
LYMPHOCYTES # BLD: 20.9 %
LYMPHOCYTES ABSOLUTE: 1.4 THOU/MM3 (ref 1–4.8)
MCH RBC QN AUTO: 28.2 PG (ref 26–33)
MCHC RBC AUTO-ENTMCNC: 33 GM/DL (ref 32.2–35.5)
MCV RBC AUTO: 85.6 FL (ref 80–94)
MISCELLANEOUS 2: ABNORMAL
MONOCYTES # BLD: 9.3 %
MONOCYTES ABSOLUTE: 0.6 THOU/MM3 (ref 0.4–1.3)
NITRITE, URINE: NEGATIVE
NUCLEATED RED BLOOD CELLS: 0 /100 WBC
OSMOLALITY CALCULATION: 280.2 MOSMOL/KG (ref 275–300)
PH UA: 6.5 (ref 5–9)
PLATELET # BLD: 259 THOU/MM3 (ref 130–400)
PMV BLD AUTO: 9.8 FL (ref 9.4–12.4)
POTASSIUM REFLEX MAGNESIUM: 4.4 MEQ/L (ref 3.5–5.2)
PROTEIN UA: 30
RBC # BLD: 5.14 MILL/MM3 (ref 4.7–6.1)
RBC URINE: ABNORMAL /HPF
RENAL EPITHELIAL, UA: ABNORMAL
SEG NEUTROPHILS: 67.5 %
SEGMENTED NEUTROPHILS ABSOLUTE COUNT: 4.7 THOU/MM3 (ref 1.8–7.7)
SODIUM BLD-SCNC: 140 MEQ/L (ref 135–145)
SPECIFIC GRAVITY, URINE: > 1.03 (ref 1–1.03)
TOTAL PROTEIN: 7.8 G/DL (ref 6.1–8)
TROPONIN T: < 0.01 NG/ML
UROBILINOGEN, URINE: 1 EU/DL (ref 0–1)
WBC # BLD: 6.9 THOU/MM3 (ref 4.8–10.8)
WBC UA: ABNORMAL /HPF
YEAST: ABNORMAL

## 2021-07-10 PROCEDURE — 85379 FIBRIN DEGRADATION QUANT: CPT

## 2021-07-10 PROCEDURE — 81001 URINALYSIS AUTO W/SCOPE: CPT

## 2021-07-10 PROCEDURE — 74176 CT ABD & PELVIS W/O CONTRAST: CPT

## 2021-07-10 PROCEDURE — 85025 COMPLETE CBC W/AUTO DIFF WBC: CPT

## 2021-07-10 PROCEDURE — 96374 THER/PROPH/DIAG INJ IV PUSH: CPT

## 2021-07-10 PROCEDURE — 93005 ELECTROCARDIOGRAM TRACING: CPT | Performed by: INTERNAL MEDICINE

## 2021-07-10 PROCEDURE — 6360000002 HC RX W HCPCS: Performed by: INTERNAL MEDICINE

## 2021-07-10 PROCEDURE — 96375 TX/PRO/DX INJ NEW DRUG ADDON: CPT

## 2021-07-10 PROCEDURE — 84484 ASSAY OF TROPONIN QUANT: CPT

## 2021-07-10 PROCEDURE — 80053 COMPREHEN METABOLIC PANEL: CPT

## 2021-07-10 PROCEDURE — 2580000003 HC RX 258: Performed by: INTERNAL MEDICINE

## 2021-07-10 PROCEDURE — 83690 ASSAY OF LIPASE: CPT

## 2021-07-10 PROCEDURE — 36415 COLL VENOUS BLD VENIPUNCTURE: CPT

## 2021-07-10 PROCEDURE — 99284 EMERGENCY DEPT VISIT MOD MDM: CPT

## 2021-07-10 PROCEDURE — 71046 X-RAY EXAM CHEST 2 VIEWS: CPT

## 2021-07-10 RX ORDER — 0.9 % SODIUM CHLORIDE 0.9 %
1000 INTRAVENOUS SOLUTION INTRAVENOUS ONCE
Status: COMPLETED | OUTPATIENT
Start: 2021-07-10 | End: 2021-07-10

## 2021-07-10 RX ORDER — POLYETHYLENE GLYCOL 3350 17 G/17G
17 POWDER, FOR SOLUTION ORAL DAILY
Qty: 1 BOTTLE | Refills: 0 | Status: SHIPPED | OUTPATIENT
Start: 2021-07-10 | End: 2021-07-17

## 2021-07-10 RX ORDER — MORPHINE SULFATE 2 MG/ML
2 INJECTION, SOLUTION INTRAMUSCULAR; INTRAVENOUS ONCE
Status: COMPLETED | OUTPATIENT
Start: 2021-07-10 | End: 2021-07-10

## 2021-07-10 RX ORDER — ONDANSETRON 2 MG/ML
4 INJECTION INTRAMUSCULAR; INTRAVENOUS ONCE
Status: COMPLETED | OUTPATIENT
Start: 2021-07-10 | End: 2021-07-10

## 2021-07-10 RX ADMIN — ONDANSETRON 4 MG: 2 INJECTION INTRAMUSCULAR; INTRAVENOUS at 15:54

## 2021-07-10 RX ADMIN — MORPHINE SULFATE 2 MG: 2 INJECTION, SOLUTION INTRAMUSCULAR; INTRAVENOUS at 15:54

## 2021-07-10 RX ADMIN — SODIUM CHLORIDE 1000 ML: 9 INJECTION, SOLUTION INTRAVENOUS at 15:54

## 2021-07-10 ASSESSMENT — PAIN SCALES - GENERAL
PAINLEVEL_OUTOF10: 4
PAINLEVEL_OUTOF10: 8
PAINLEVEL_OUTOF10: 4

## 2021-07-10 ASSESSMENT — PAIN DESCRIPTION - DESCRIPTORS: DESCRIPTORS: STABBING

## 2021-07-10 ASSESSMENT — PAIN DESCRIPTION - ORIENTATION: ORIENTATION: LEFT;LOWER

## 2021-07-10 ASSESSMENT — PAIN DESCRIPTION - LOCATION: LOCATION: FLANK;RIB CAGE

## 2021-07-10 ASSESSMENT — PAIN DESCRIPTION - PAIN TYPE: TYPE: ACUTE PAIN

## 2021-07-10 ASSESSMENT — PAIN DESCRIPTION - FREQUENCY: FREQUENCY: CONTINUOUS

## 2021-07-10 NOTE — ED PROVIDER NOTES
eMERGENCY dEPARTMENT eNCOUnter      [facility]    Chief Complaint   Patient presents with    Rib Pain     left    Flank Pain     left       HPI    Nevin Hartley is a 45 y.o. male who presents emergency department because of left-sided costovertebral angle pain. Patient is having this pain for last 6 hours. There is no elevating or relieving factors for this pain. Patient's pain is radiating to his left lower quadrant. Patient does not have any dysuria frequency or hematuria. Patient does not have any nausea, vomiting, constipation or diarrhea associated with the symptoms, flatus is positive. There is no fever or chills. Patient does not have any chest pain or chest pressure.  There is no shortness of breath, dizziness, sweating or palpitations    PAST MEDICAL HISTORY    Past Medical History:   Diagnosis Date    Anxiety     CAD (coronary artery disease)     Chronic back pain     Depression     Headache(784.0)     Hypertension     Kidney stone     [facility]traumatic [facility] injury) ([facility] [address]     Unspecified sleep apnea        SURGICAL HISTORY    Past Surgical History:   Procedure Laterality Date    PLANTAR FASCIA SURGERY Left 7/10/2020    LEFT TIBIAL BONE MARROW HARVEST, LEFT TARSAL TUNNEL DECOMPRESSION, INSTEP PLANTAR FASCIOTOMY, POSTERIOR COMPARTMENT FASCIOTOMY, MORTONS NEUROMA performed by Milady Galeano DPM at [address] [facility] [place]         CURRENT MEDICATIONS    Current Outpatient Rx   Medication Sig Dispense Refill    polyethylene glycol (MIRALAX) 17 GM/SCOOP powder Take 17 g by mouth daily for 7 days PRN constipation 1 Bottle 0    lamoTRIgine (LAMICTAL) 100 MG tablet Take 100 mg by mouth daily      losartan (COZAAR) 50 MG tablet Take 50 mg by mouth daily      hydrOXYzine (VISTARIL) 25 MG capsule Take 25 mg by mouth 3 times daily as needed for Itching      tamsulosin (FLOMAX) 0.4 MG capsule Take 1 capsule by mouth daily 30 capsule 0    ketorolac (TORADOL) 10 MG Club or Organization Meetings:     Marital Status:    Intimate Partner Violence:     Fear of Current or Ex-Partner:     Emotionally Abused:     Physically Abused:     Sexually Abused:        REVIEW OF SYSTEMS    Constitutional:  Denies fever, chills, weight loss or weakness   Eyes:  Denies photophobia or discharge   HENT:  Denies sore throat or ear pain   Respiratory:  Denies cough or shortness of breath   Cardiovascular:  Denies chest pain, palpitations or swelling   GI:  Denies abdominal pain, nausea, vomiting, or diarrhea   Musculoskeletal:  Denies back pain   Skin:  Denies rash   Neurologic:  Denies headache, focal weakness or sensory changes   Endocrine:  Denies polyuria or polydypsia   Lymphatic:  Denies swollen glands   Psychiatric:  Denies depression, suicidal ideation or homicidal ideation   All systems negative except as marked. PHYSICAL EXAM    VITAL SIGNS: BP (!) 180/116   Pulse 89   Temp 98.7 °F (37.1 °C) (Oral)   Resp 18   Ht 5' 11\" (1.803 m)   Wt 230 lb (104.3 kg)   SpO2 96%   BMI 32.08 kg/m²    Constitutional:  Well developed, Well nourished, No acute distress, Non-toxic appearance. HENT:  Normocephalic, Atraumatic, Bilateral external ears normal, Oropharynx moist, No oral exudates, Nose normal. Neck- Normal range of motion, No tenderness, Supple, No stridor. Eyes:  PERRL, EOMI, Conjunctiva normal, No discharge. Respiratory:  Normal breath sounds, No respiratory distress, No wheezing, No chest tenderness. Cardiovascular:  Normal heart rate, Normal rhythm, No murmurs, No rubs, No gallops. GI:  Bowel sounds normal, Soft, left lower quadrant and left CVA tenderness, No masses, No pulsatile masses. : External genitalia appear normal, No masses or lesions. No discharge. No CVA tenderness. Musculoskeletal:  Intact distal pulses, No edema, No tenderness, No cyanosis, No clubbing. Good range of motion in all major joints. No tenderness to palpation or major deformities noted. Back- No tenderness. Integument:  Warm, Dry, No erythema, No rash. Lymphatic:  No lymphadenopathy noted. Neurologic:  Alert & oriented x 3, Normal motor function, Normal sensory function, No focal deficits noted. Psychiatric:  Affect normal, Judgment normal, Mood normal.     LABS  Labs Reviewed   COMPREHENSIVE METABOLIC PANEL W/ REFLEX TO MG FOR LOW K - Abnormal; Notable for the following components:       Result Value    Glucose 124 (*)     CO2 21 (*)     All other components within normal limits   URINE WITH REFLEXED MICRO - Abnormal; Notable for the following components:    Bilirubin Urine SMALL (*)     Ketones, Urine TRACE (*)     Specific Gravity, Urine > 1.030 (*)     Protein, UA 30 (*)     Color, UA DK YELLOW (*)     All other components within normal limits   CBC WITH AUTO DIFFERENTIAL   LIPASE   TROPONIN   D-DIMER, QUANTITATIVE   ANION GAP   GLOMERULAR FILTRATION RATE, ESTIMATED   OSMOLALITY   BILE ACIDS, TOTAL       RADIOLOGY    XR CHEST (2 VW)   Final Result   1. Normal heart size. No acute infiltrates or effusions are seen. 2. Mild pleural thickening along lateral aspect left lung, likely chronic. **This report has been created using voice recognition software. It may contain minor errors which are inherent in voice recognition technology. **      Final report electronically signed by Dr. Britni Hussein on 7/10/2021 3:45 PM      CT ABDOMEN PELVIS WO CONTRAST Additional Contrast? None   Final Result   Constipation. Filter in the IVC with appearance unchanged from prior study. No acute findings. **This report has been created using voice recognition software. It may contain minor errors which are inherent in voice recognition technology. **      Final report electronically signed by Dr. Britni Hussein on 7/10/2021 3:44 PM          PROCEDURES        ED [facility]    Pertinent Labs & Imaging studies reviewed.  (See chart for details)  All of patient's labs were reviewed along with CT scan of the abdomen. Patient has constipation, IVC filter. Patient chest x-ray showed mild pleural thickening along the lateral aspect of the left lung which was chronic. Patient refused to have enema in the emergency department. Patient will be discharged home with MiraLAX. Patient is advised to follow-up with his primary care physician on Monday. He is also advised to come back to the emergency department if the symptoms get worse. FINAL IMPRESSION    1.  Constipation, unspecified constipation type             Susan Tillman MD  07/10/21 5248

## 2021-07-10 NOTE — LETTER
[facility]  [address] [place] [address]  Phone: [phone]               July 10, 2021    Patient: Marco Antonio Frey   YOB: 1980   Date of Visit: 7/10/2021       To Whom It May Concern:    Bela Tubbs was seen and treated in our emergency department on 7/10/2021. He may return to work on 7/11/2021.       Sincerely,       BERNARDA Matthew        Signature:__________________________________

## 2021-07-10 NOTE — ED TRIAGE NOTES
Pt presents to ED c/c left sided flank and lower rib pain. Pt states he felt it a little when he was moving from his old residence last week but woke up this morning and the same area is very painful. Pt has hx of kidney stones.

## 2021-07-11 ENCOUNTER — APPOINTMENT (OUTPATIENT)
Dept: GENERAL RADIOLOGY | Age: 41
End: 2021-07-11
Payer: MEDICARE

## 2021-07-11 VITALS
WEIGHT: 230 LBS | TEMPERATURE: 98.2 F | SYSTOLIC BLOOD PRESSURE: 190 MMHG | HEART RATE: 90 BPM | HEIGHT: 71 IN | DIASTOLIC BLOOD PRESSURE: 129 MMHG | OXYGEN SATURATION: 95 % | RESPIRATION RATE: 16 BRPM | BODY MASS INDEX: 32.2 KG/M2

## 2021-07-11 LAB
EKG ATRIAL RATE: 95 BPM
EKG P AXIS: 52 DEGREES
EKG P-R INTERVAL: 164 MS
EKG Q-T INTERVAL: 354 MS
EKG QRS DURATION: 84 MS
EKG QTC CALCULATION (BAZETT): 444 MS
EKG R AXIS: 7 DEGREES
EKG T AXIS: 61 DEGREES
EKG VENTRICULAR RATE: 95 BPM

## 2021-07-11 PROCEDURE — 93010 ELECTROCARDIOGRAM REPORT: CPT | Performed by: INTERNAL MEDICINE

## 2021-07-11 PROCEDURE — 74018 RADEX ABDOMEN 1 VIEW: CPT

## 2021-07-11 ASSESSMENT — PAIN DESCRIPTION - PAIN TYPE: TYPE: ACUTE PAIN

## 2021-07-11 ASSESSMENT — PAIN DESCRIPTION - LOCATION: LOCATION: ABDOMEN

## 2021-07-11 ASSESSMENT — PAIN SCALES - GENERAL: PAINLEVEL_OUTOF10: 10

## 2021-07-11 ASSESSMENT — PAIN DESCRIPTION - FREQUENCY: FREQUENCY: CONTINUOUS

## 2021-07-11 ASSESSMENT — PAIN DESCRIPTION - ONSET: ONSET: ON-GOING

## 2021-07-11 NOTE — ED NOTES
Pt calls out on call light, states need to use restroom. This nurse responds, pt holding styrofoam cup and states, \"I was going to pee in this\", as pt suggests it took too long for this nurse to respond. Pt advised where restroom is on unit and walked to restroom. No distress noted at this time, pt breaths easy and unlabored.        Rachel, [facility]  07/11/21 9312

## 2021-07-11 NOTE — ED NOTES
Pt to ED for complaint of ongoing constipation and abdominal pain rated 10/10 at this time. Pt states he was present in ED for constipation yesterday and discharged between 0865-6271. Pt states going home with Polyethylene Glycol and took with no relief. Pt states taking 8 Oxford for pain with no relief. Pt states no BM today. Pt states no vomiting today with nausea. Pt states no further symptoms at this time. Breaths easy and unlabored. No distress noted.        Rachel, [facility]  07/11/21 0002

## 2021-07-11 NOTE — ED NOTES
Pt refuses to sit for vital signs check, stating too much pain. Pt paces about as BP ongoing.        [facility]  07/11/21 5609

## 2021-07-11 NOTE — ED NOTES
Pt argues he does not need xray at this time with this nurse. Pt educated on process of KUB and rationale for KUB being ordered. Pt states, \"whatever you say\".        [facility]  07/11/21 6000

## 2021-07-11 NOTE — ED NOTES
Pt states at discharge that he took 13 Norco at home between initial discharge yesterday evening and presenting to ED for this visit. Pt educated on effects of Norco and constipation, refuses to state understanding. Pt states, \"I'm going to [facility]". Pt exits ED with no distress noted, states no further questions.        Rachel, [facility]  07/11/21 9471

## 2021-08-09 ASSESSMENT — ENCOUNTER SYMPTOMS
BACK PAIN: 0
SHORTNESS OF BREATH: 0
EYE REDNESS: 0
ABDOMINAL PAIN: 1
TROUBLE SWALLOWING: 0
NAUSEA: 0
COUGH: 0
CONSTIPATION: 1
VOMITING: 0

## 2021-08-10 NOTE — ED PROVIDER NOTES
[facility] [address] [address] EMERGENCY DEPT    EMERGENCY MEDICINE     Pt Name: Patito Cosby  MRN: [id number]  [place] 1980  Date of evaluation: 7/10/2021  Provider: Ed Pathak MD,     [address] [place] [address]       Chief Complaint   Patient presents with    Abdominal Pain    Constipation       HISTORY OF PRESENT ILLNESS    Patito Cosby is a pleasant 45 y.o. male who presents to the emergency department from home complaint of ongoing constipation abdominal pain. Patient states he was present in the ED for constipation yesterday and was discharged. He states that he went home with MiraLAX and he drank the whole bottle of MiraLAX but has not had a bowel movement yet. Patient states he also took a Norco for pain with no relief there. Patient states he has had no vomiting but he has had nausea. He denies any fever, chills, chest pain, difficulty breathing, or shortness of breath. Triage notes and Nursing notes were reviewed by myself. Any discrepancies are addressed above.     PAST MEDICAL HISTORY     Past Medical History:   Diagnosis Date    Anxiety     CAD (coronary artery disease)     Chronic back pain     Depression     Headache(784.0)     Hypertension     Kidney stone     TBI (traumatic brain injury) ([facility] [address]     Unspecified sleep apnea        SURGICAL HISTORY       Past Surgical History:   Procedure Laterality Date    PLANTAR FASCIA SURGERY Left 7/10/2020    LEFT TIBIAL BONE MARROW HARVEST, LEFT TARSAL TUNNEL DECOMPRESSION, INSTEP PLANTAR FASCIOTOMY, POSTERIOR COMPARTMENT FASCIOTOMY, MORTONS NEUROMA performed by Mary Lopez DPM at [address] [place] [address]       Discharge Medication List as of 7/11/2021  2:11 AM      CONTINUE these medications which have NOT CHANGED    Details   polyethylene glycol (MIRALAX) 17 GM/SCOOP powder Take 17 g by mouth daily for 7 days PRN constipation, Disp-1 Bottle, R-0Normal      lamoTRIgine (LAMICTAL) 100 MG tablet Take 100 mg by mouth dailyHistorical Med      losartan (COZAAR) 50 MG tablet Take 50 mg by mouth dailyHistorical Med      hydrOXYzine (VISTARIL) 25 MG capsule Take 25 mg by mouth 3 times daily as needed for ItchingHistorical Med      tamsulosin (FLOMAX) 0.4 MG capsule Take 1 capsule by mouth daily, Disp-30 capsule, R-0Normal      ketorolac (TORADOL) 10 MG tablet Take 1 tablet by mouth every 6 hours as needed for Pain, Disp-20 tablet, R-0Print      rivaroxaban (XARELTO) 10 MG TABS tablet Take 1 tablet by mouth daily (with breakfast) for 14 days, Disp-14 tablet, R-0Print      DULoxetine (CYMBALTA) 60 MG extended release capsule Take 60 mg by mouth dailyHistorical Med      QUEtiapine (SEROQUEL) 25 MG tablet Take 25 mg by mouth nightlyHistorical Med      naproxen (NAPROSYN) 500 MG tablet Take 1 tablet by mouth 2 times daily for 10 days, Disp-20 tablet, R-0Print             ALLERGIES     Robitussin [guaifenesin]    FAMILY HISTORY       Family History   Problem Relation Age of Onset    Diabetes Mother     High Blood Pressure Father         SOCIAL HISTORY       Social History     Socioeconomic History    Marital status:      Spouse name: None    Number of children: None    Years of education: None    Highest education level: None   Occupational History    None   Tobacco Use    Smoking status: Current Every Day Smoker     Packs/day: 0.50     Types: Cigarettes     Start date: 1/1/2014    Smokeless tobacco: Never Used   Vaping Use    Vaping Use: Never used   Substance and Sexual Activity    Alcohol use: No    Drug use: No    Sexual activity: None   Other Topics Concern    None   Social History Narrative    None     Social Determinants of Health     Financial Resource Strain:     Difficulty of Paying Living Expenses:    Food Insecurity:     Worried About Running Out of Food in the Last Year:     Ran Out of Food in the Last Year:    Transportation Needs:     Lack of Transportation (Medical): Movements: Extraocular movements intact. Pupils: Pupils are equal, round, and reactive to light. Cardiovascular:      Rate and Rhythm: Normal rate and regular rhythm. Heart sounds: No murmur heard. Pulmonary:      Effort: Pulmonary effort is normal. No respiratory distress. Breath sounds: Normal breath sounds. No decreased breath sounds. Abdominal:      General: Abdomen is flat. Bowel sounds are normal. There is no distension. Palpations: Abdomen is soft. Tenderness: There is generalized abdominal tenderness. There is no guarding or rebound. Musculoskeletal:      Cervical back: Neck supple. Right lower leg: No edema. Left lower leg: No edema. Skin:     General: Skin is warm and dry. Capillary Refill: Capillary refill takes less than 2 seconds. Neurological:      General: No focal deficit present. Mental Status: He is alert. DIAGNOSTIC RESULTS     EKG:(none if blank)  All EKG's are interpreted by [facility] who either signs or Co-signs this chart in the absence of a cardiologist.        RADIOLOGY: (none if blank)   Interpretation per the Radiologistbelow, if available at the time of this note:    XR ABDOMEN (KUB) (SINGLE AP VIEW)   Final Result   No significant colonic stool burden. Nonobstructive bowel gas pattern. This document has been electronically signed by: Judy Hall MD on    07/11/2021 02:01 AM          LABS:  Labs Reviewed - No data to display    All other labs were within normal range or not returned as of this dictation. Please note, any cultures that may have been sent were not resulted at the time of this patient visit. EMERGENCY DEPARTMENT COURSE andMedical Decision Making:     MDM  Number of Diagnoses or Management Options  Constipation, unspecified constipation type  Diagnosis management comments: 28-year-old male presents emergency room for constipation.   Patient has not had any significant change however he did drink the whole bottle of MiraLAX and has not had any relief. His abdominal exam is unremarkable. I will repeat an x-ray here as he had a full evaluation yesterday. It is possible that the MiraLAX is causing cramping given that he took the whole bottle which is what we typically use for colonoscopy prep.  /  ED Course as of Aug 09 2148   Sun Jul 11, 2021   0209 Updated patient on x-ray results. Patient is concerned because he is still having pain. I explained to him that drinking a whole bottle of MiraLAX will cause abdominal cramping. He did have a significant amount of stool on both his CT and his x-ray. I explained that MiraLAX does take 6 to 12 hours for effect. Patient was very hypertensive however he was pacing and yelling at the nurse prior to me coming into the room. He refused to sit down and have his blood pressure taken. Patient is extremely agitated that we will not be giving him pain medicine for his constipation pain. [DD]      ED Course User Index  [DD] Alanna Cramer MD         The patient was evaluated during the global COVID-19 pandemic, and that diagnosis was considered upon their initial presentation. Their evaluation, treatment and testing was consistent with current guidelines for patients who present with complaints or symptoms that may be related to COVID-19. Strict returnprecautions and follow up instructions were discussed with the patient with which the patient agrees        ED Medications administered this visit:  Medications - No data to display      Procedures: (None if blank)       CLINICAL       1.  Constipation, unspecified constipation type          DISPOSITION/PLAN   DISPOSITION Decision To Discharge 07/11/2021 02:10:43 AM      PATIENT REFERRED TO:  Valorie Heck [address] [place] [address]  [phone]    Schedule an appointment as soon as possible for a visit   If symptoms worsen      DISCHARGE MEDICATIONS:  Discharge

## 2022-03-19 ENCOUNTER — APPOINTMENT (OUTPATIENT)
Dept: GENERAL RADIOLOGY | Age: 42
End: 2022-03-19
Payer: COMMERCIAL

## 2022-03-19 ENCOUNTER — APPOINTMENT (OUTPATIENT)
Dept: GENERAL RADIOLOGY | Age: 42
End: 2022-03-19

## 2022-03-19 ENCOUNTER — HOSPITAL ENCOUNTER (EMERGENCY)
Age: 42
Discharge: HOME | End: 2022-03-19
Attending: FAMILY MEDICINE

## 2022-03-19 ENCOUNTER — HOSPITAL ENCOUNTER (EMERGENCY)
Age: 42
Discharge: HOME | End: 2022-03-19
Attending: EMERGENCY MEDICINE
Payer: COMMERCIAL

## 2022-03-19 VITALS
HEART RATE: 73 BPM | TEMPERATURE: 97.5 F | SYSTOLIC BLOOD PRESSURE: 148 MMHG | WEIGHT: 235 LBS | DIASTOLIC BLOOD PRESSURE: 104 MMHG | HEIGHT: 71 IN | OXYGEN SATURATION: 94 % | RESPIRATION RATE: 18 BRPM | BODY MASS INDEX: 32.9 KG/M2

## 2022-03-19 VITALS
SYSTOLIC BLOOD PRESSURE: 119 MMHG | HEIGHT: 71 IN | WEIGHT: 240 LBS | BODY MASS INDEX: 33.6 KG/M2 | OXYGEN SATURATION: 98 % | RESPIRATION RATE: 16 BRPM | TEMPERATURE: 98 F | HEART RATE: 64 BPM | DIASTOLIC BLOOD PRESSURE: 89 MMHG

## 2022-03-19 DIAGNOSIS — S60.222A CONTUSION OF LEFT HAND, INITIAL ENCOUNTER: Primary | ICD-10-CM

## 2022-03-19 DIAGNOSIS — S61.213A LACERATION OF LEFT MIDDLE FINGER WITHOUT FOREIGN BODY WITHOUT DAMAGE TO NAIL, INITIAL ENCOUNTER: Primary | ICD-10-CM

## 2022-03-19 PROCEDURE — 73130 X-RAY EXAM OF HAND: CPT

## 2022-03-19 PROCEDURE — 99283 EMERGENCY DEPT VISIT LOW MDM: CPT

## 2022-03-19 PROCEDURE — 6370000000 HC RX 637 (ALT 250 FOR IP): Performed by: EMERGENCY MEDICINE

## 2022-03-19 RX ORDER — LIDOCAINE HYDROCHLORIDE AND EPINEPHRINE 10; 10 MG/ML; UG/ML
20 INJECTION, SOLUTION INFILTRATION; PERINEURAL ONCE
Status: DISCONTINUED | OUTPATIENT
Start: 2022-03-19 | End: 2022-03-19 | Stop reason: HOSPADM

## 2022-03-19 RX ORDER — CLONIDINE HYDROCHLORIDE 0.3 MG/1
0.3 TABLET ORAL 2 TIMES DAILY
COMMUNITY

## 2022-03-19 RX ORDER — ACETAMINOPHEN 500 MG
1000 TABLET ORAL ONCE
Status: COMPLETED | OUTPATIENT
Start: 2022-03-19 | End: 2022-03-19

## 2022-03-19 RX ORDER — METOPROLOL SUCCINATE 25 MG/1
25 TABLET, EXTENDED RELEASE ORAL DAILY
COMMUNITY

## 2022-03-19 RX ADMIN — ACETAMINOPHEN 1000 MG: 500 TABLET ORAL at 05:22

## 2022-03-19 ASSESSMENT — ENCOUNTER SYMPTOMS
PHOTOPHOBIA: 0
CHOKING: 0
STRIDOR: 0
GASTROINTESTINAL NEGATIVE: 1
CHEST TIGHTNESS: 0
SHORTNESS OF BREATH: 0
COUGH: 0

## 2022-03-19 ASSESSMENT — PAIN DESCRIPTION - LOCATION: LOCATION: FINGER (COMMENT WHICH ONE)

## 2022-03-19 ASSESSMENT — PAIN SCALES - GENERAL
PAINLEVEL_OUTOF10: 5
PAINLEVEL_OUTOF10: 5

## 2022-03-19 ASSESSMENT — PAIN DESCRIPTION - PAIN TYPE: TYPE: ACUTE PAIN

## 2022-03-19 ASSESSMENT — PAIN - FUNCTIONAL ASSESSMENT: PAIN_FUNCTIONAL_ASSESSMENT: 0-10

## 2022-03-19 NOTE — ED NOTES
XR at bedside. Pt medicated per MAR. Pt denies any further needs.       Roberto Carlos Mccauley, BERNARDA  03/19/22 8666

## 2022-03-19 NOTE — ED PROVIDER NOTES
[facility] ENCOUNTER          Pt Name: Laurecne Ruvalcaba  MRN: [id number]  [place] 1980  Date of evaluation: 3/19/2022  Treating Resident Physician: Ad Matthews MD  Supervising Physician: Dr. Marin Chan       Chief Complaint   Patient presents with    Laceration     History obtained from the patient. HISTORY OF PRESENT ILLNESS    HPI  Laurence Ruvalcaba is a 37 y.o. male who presents to the emergency department for evaluation of contusion. Patient was seen by me earlier in the night. Had been seen for laceration. States that he went home, somehow tripped over a rug, fell forward landing on his left hand. Patient complaining of swelling, pain to the left hand. Denies any numbness, tingling, weakness. The patient has no other acute complaints at this time. REVIEW OF SYSTEMS   Review of Systems   Constitutional: Negative for chills, diaphoresis, fatigue and fever. HENT: Negative. Eyes: Negative for photophobia and visual disturbance. Respiratory: Negative for cough, choking, chest tightness, shortness of breath and stridor. Cardiovascular: Negative for chest pain. Gastrointestinal: Negative. Genitourinary: Negative. Musculoskeletal: Negative for arthralgias, myalgias, neck pain and neck stiffness. Neurological: Negative for weakness and numbness.           PAST MEDICAL AND SURGICAL HISTORY     Past Medical History:   Diagnosis Date    Anxiety     CAD (coronary artery disease)     Chronic back pain     Depression     Headache(784.0)     Hypertension     Kidney stone     [facility]) [facility] [address]     Unspecified sleep apnea      Past Surgical History:   Procedure Laterality Date    PLANTAR FASCIA SURGERY Left 7/10/2020    LEFT TIBIAL BONE MARROW HARVEST, LEFT TARSAL TUNNEL DECOMPRESSION, INSTEP PLANTAR FASCIOTOMY, POSTERIOR COMPARTMENT FASCIOTOMY, MORTONS NEUROMA performed by Berta Wood DPM at [place] [address].     Current Facility-Administered Medications:     lidocaine-EPINEPHrine 1 %-1:205225 injection 20 mL, 20 mL, IntraDERmal, Once, Annabella Trivedi MD    Current Outpatient Medications:     metoprolol succinate (TOPROL XL) 25 MG extended release tablet, Take 25 mg by mouth daily, Disp: , Rfl:     cloNIDine (CATAPRES) 0.3 MG tablet, Take 0.3 mg by mouth 2 times daily, Disp: , Rfl:     lamoTRIgine (LAMICTAL) 100 MG tablet, Take 100 mg by mouth daily, Disp: , Rfl:     losartan (COZAAR) 50 MG tablet, Take 50 mg by mouth daily, Disp: , Rfl:     hydrOXYzine (VISTARIL) 25 MG capsule, Take 25 mg by mouth 3 times daily as needed for Itching, Disp: , Rfl:     tamsulosin (FLOMAX) 0.4 MG capsule, Take 1 capsule by mouth daily, Disp: 30 capsule, Rfl: 0    ketorolac (TORADOL) 10 MG tablet, Take 1 tablet by mouth every 6 hours as needed for Pain, Disp: 20 tablet, Rfl: 0    rivaroxaban (XARELTO) 10 MG TABS tablet, Take 1 tablet by mouth daily (with breakfast) for 14 days, Disp: 14 tablet, Rfl: 0    DULoxetine (CYMBALTA) 60 MG extended release capsule, Take 60 mg by mouth daily, Disp: , Rfl:     QUEtiapine (SEROQUEL) 25 MG tablet, Take 25 mg by mouth nightly, Disp: , Rfl:     naproxen (NAPROSYN) 500 MG tablet, Take 1 tablet by mouth 2 times daily for 10 days, Disp: 20 tablet, Rfl: 0      SOCIAL HISTORY     Social History     Social History Narrative    Not on file     Social History     Tobacco Use    Smoking status: Current Every Day Smoker     Packs/day: 0.50     Types: Cigarettes     Start date: 1/1/2014    Smokeless tobacco: Never Used   Vaping Use    Vaping Use: Never used   Substance Use Topics    Alcohol use: No    Drug use: No         ALLERGIES     Allergies   Allergen Reactions    Robitussin [Guaifenesin] Hives         FAMILY HISTORY     Family History   Problem Relation Age of Onset    Diabetes Mother  High Blood Pressure Father          PREVIOUS RECORDS   Previous records reviewed: Medical, past surgical, medications, allergies        PHYSICAL EXAM     ED Triage Vitals [03/19/22 0102]   BP Temp Temp Source Pulse Resp SpO2 Height Weight   119/89 98 °F (36.7 °C) Oral 64 16 98 % 5' 11\" (1.803 m) 240 lb (108.9 kg)     Initial vital signs and nursing assessment reviewed and normal. Body mass index is 33.47 kg/m². Pulsoximetry is normal per my interpretation. Additional Vital Signs:  Vitals:    03/19/22 0102   BP: 119/89   Pulse: 64   Resp: 16   Temp: 98 °F (36.7 °C)   SpO2: 98%       Physical Exam  Constitutional:       Appearance: Normal appearance. HENT:      Head: Normocephalic and atraumatic. Right Ear: External ear normal.      Left Ear: External ear normal.      Nose: Nose normal.      Mouth/Throat:      Mouth: Mucous membranes are moist.      Pharynx: Oropharynx is clear. Eyes:      Extraocular Movements: Extraocular movements intact. Conjunctiva/sclera: Conjunctivae normal.      Pupils: Pupils are equal, round, and reactive to light. Cardiovascular:      Rate and Rhythm: Normal rate and regular rhythm. Pulses: Normal pulses. Pulmonary:      Effort: Pulmonary effort is normal. No respiratory distress. Abdominal:      General: Abdomen is flat. Bowel sounds are normal.      Palpations: Abdomen is soft. Tenderness: There is no abdominal tenderness. Musculoskeletal:         General: Tenderness and signs of injury present. No swelling or deformity. Right lower leg: No edema. Left lower leg: No edema. Comments: 1 cm laceration over the dorsum of the proximal phalanx of the left middle finger, most with suture. No bleeding. Contusion over dorsum of third and fourth metacarpal.  Tender to palpation     Skin:     General: Skin is warm and dry. Capillary Refill: Capillary refill takes less than 2 seconds. Neurological:      General: No focal deficit present. Mental Status: He is alert. Sensory: No sensory deficit. Motor: No weakness. MEDICAL DECISION MAKING   Initial Assessment:   1. This is a 56-year-old male, presenting for fall on hand after being seen for laceration on finger. Physical exam significant for contusion over dorsum of left hand. Plan:    X-ray unremarkable.  Discharged home with strict return precautions, follow-up        ED RESULTS   Laboratory results:  Labs Reviewed - No data to display    Radiologic studies results:  XR HAND LEFT (MIN 3 VIEWS)    (Results Pending)       ED Medications administered this visit:   Medications   acetaminophen (TYLENOL) tablet 1,000 mg (1,000 mg Oral Given 3/19/22 0522)         ED COURSE         Strict return precautions and follow up instructions were discussed with the patient prior to discharge, with which the patient agrees. MEDICATION CHANGES     New Prescriptions    No medications on file         FINAL DISPOSITION     Final diagnoses:   Contusion of left hand, initial encounter     Condition: condition: good  Dispo: Discharge to home      This transcription was electronically signed. Parts of this transcriptions may have been dictated by use of voice recognition software and electronically transcribed, and parts may have been transcribed with the assistance of an ED scribe. The transcription may contain errors not detected in proofreading. Please refer to my supervising physician's documentation if my documentation differs.     Electronically Signed: Angelica Carrera MD, 03/19/22, 6:15 AM          Angelica Carrera MD  Resident  03/19/22 7038

## 2022-03-19 NOTE — ED TRIAGE NOTES
Pt presents to ED with complaints of a laceration on left middle finger. Pt was seen here yesterday for same issue. Pt states he tripped and fell earlier at home and that the laceration was bleeding and wouldn't stop. Stitches still intact at this time. Bleeding is controlled.

## 2022-03-19 NOTE — ED TRIAGE NOTES
Pt to the ED via lobby with complaint of a laceration to the left hand middle finger that happened around 1400. Pt stated that he was sharpening his knives and one of them slipped. Pt stated that he cut his finger \"to the bone. \" Pt stated he wrapped his finger really tight to stop the blood. Pt VSS.

## 2022-03-19 NOTE — ED PROVIDER NOTES
[address] [address]          Pt Name: Rose Jiang  MRN: [id number]  [place] 1980  Date of evaluation: 3/19/2022  Treating Resident Physician: Damián Mccarthy MD  Supervising Physician: Dr. Ashia Blankenship       Chief Complaint   Patient presents with    Laceration     left hand middle finger     History obtained from the patient. HISTORY OF PRESENT ILLNESS        Rose Jiang is a 37 y.o. male who presents to the emergency department for evaluation of laceration to finger. Patient states that he was sharpening his knife, accidentally sliced his left middle finger on the dorsum. Apparently had significant bleeding initially. Applied bandages. Did initially rinse out and use antibiotic ointment. This happened 12 hours prior to arrival.  Patient states that he thinks the knife went to the bone. Declining x-ray at this time  The patient has no other acute complaints at this time. REVIEW OF SYSTEMS   Review of Systems   Constitutional: Negative for chills, diaphoresis, fatigue and fever. HENT: Negative. Eyes: Negative for photophobia and visual disturbance. Respiratory: Negative for cough, choking, chest tightness, shortness of breath and stridor. Cardiovascular: Negative for chest pain. Gastrointestinal: Negative. Genitourinary: Negative. Musculoskeletal: Negative for arthralgias, myalgias, neck pain and neck stiffness. Neurological: Negative for weakness and numbness.           PAST MEDICAL AND SURGICAL HISTORY     Past Medical History:   Diagnosis Date    Anxiety     CAD (coronary artery disease)     Chronic back pain     Depression     Headache(784.0)     Hypertension     Kidney stone     TBI (traumatic brain injury) ([facility] [address]     Unspecified sleep apnea      Past Surgical History:   Procedure Laterality Date    PLANTAR FASCIA SURGERY Left 7/10/2020    LEFT TIBIAL BONE MARROW HARVEST, LEFT TARSAL TUNNEL DECOMPRESSION, INSTEP PLANTAR FASCIOTOMY, POSTERIOR COMPARTMENT FASCIOTOMY, MORTONS NEUROMA performed by Zakia Shah DPM at [place] [address].     Current Facility-Administered Medications:     lidocaine-EPINEPHrine 1 %-1:461842 injection 20 mL, 20 mL, IntraDERmal, Once, Ad Matthews MD    Current Outpatient Medications:     metoprolol succinate (TOPROL XL) 25 MG extended release tablet, Take 25 mg by mouth daily, Disp: , Rfl:     cloNIDine (CATAPRES) 0.3 MG tablet, Take 0.3 mg by mouth 2 times daily, Disp: , Rfl:     lamoTRIgine (LAMICTAL) 100 MG tablet, Take 100 mg by mouth daily, Disp: , Rfl:     losartan (COZAAR) 50 MG tablet, Take 50 mg by mouth daily, Disp: , Rfl:     hydrOXYzine (VISTARIL) 25 MG capsule, Take 25 mg by mouth 3 times daily as needed for Itching, Disp: , Rfl:     tamsulosin (FLOMAX) 0.4 MG capsule, Take 1 capsule by mouth daily, Disp: 30 capsule, Rfl: 0    ketorolac (TORADOL) 10 MG tablet, Take 1 tablet by mouth every 6 hours as needed for Pain, Disp: 20 tablet, Rfl: 0    rivaroxaban (XARELTO) 10 MG TABS tablet, Take 1 tablet by mouth daily (with breakfast) for 14 days, Disp: 14 tablet, Rfl: 0    DULoxetine (CYMBALTA) 60 MG extended release capsule, Take 60 mg by mouth daily, Disp: , Rfl:     QUEtiapine (SEROQUEL) 25 MG tablet, Take 25 mg by mouth nightly, Disp: , Rfl:     naproxen (NAPROSYN) 500 MG tablet, Take 1 tablet by mouth 2 times daily for 10 days, Disp: 20 tablet, Rfl: 0      SOCIAL HISTORY     Social History     Social History Narrative    Not on file     Social History     Tobacco Use    Smoking status: Current Every Day Smoker     Packs/day: 0.50     Types: Cigarettes     Start date: 1/1/2014    Smokeless tobacco: Never Used   Vaping Use    Vaping Use: Never used   Substance Use Topics    Alcohol use: No    Drug use: No         ALLERGIES     Allergies   Allergen Reactions    Robitussin [Guaifenesin] Hives         FAMILY HISTORY     Family History   Problem Relation Age of Onset    Diabetes Mother     High Blood Pressure Father          PREVIOUS RECORDS   Previous records reviewed: Medical, past surgical, medications, allergies        PHYSICAL EXAM     ED Triage Vitals [03/19/22 0102]   BP Temp Temp Source Pulse Resp SpO2 Height Weight   119/89 98 °F (36.7 °C) Oral 64 16 98 % 5' 11\" (1.803 m) 240 lb (108.9 kg)     Initial vital signs and nursing assessment reviewed and normal. Body mass index is 33.47 kg/m². Pulsoximetry is normal per my interpretation. Additional Vital Signs:  Vitals:    03/19/22 0102   BP: 119/89   Pulse: 64   Resp: 16   Temp: 98 °F (36.7 °C)   SpO2: 98%       Physical Exam  Constitutional:       Appearance: Normal appearance. HENT:      Head: Normocephalic and atraumatic. Right Ear: External ear normal.      Left Ear: External ear normal.      Nose: Nose normal.      Mouth/Throat:      Mouth: Mucous membranes are moist.      Pharynx: Oropharynx is clear. Eyes:      Extraocular Movements: Extraocular movements intact. Conjunctiva/sclera: Conjunctivae normal.      Pupils: Pupils are equal, round, and reactive to light. Cardiovascular:      Rate and Rhythm: Normal rate and regular rhythm. Pulses: Normal pulses. Pulmonary:      Effort: Pulmonary effort is normal. No respiratory distress. Abdominal:      General: Abdomen is flat. Bowel sounds are normal.      Palpations: Abdomen is soft. Tenderness: There is no abdominal tenderness. Musculoskeletal:         General: Tenderness and signs of injury present. No swelling or deformity. Right lower leg: No edema. Left lower leg: No edema. Comments: 1 cm laceration over the dorsum of the proximal phalanx of the left middle finger, bleeding controlled. Skin:     General: Skin is warm and dry. Capillary Refill: Capillary refill takes less than 2 seconds. Neurological:      General: No focal deficit present. Mental Status: He is alert. Sensory: No sensory deficit. Motor: No weakness. Lac Repair    Date/Time: 3/19/2022 7:37 AM  Performed by: Trent Pringle MD  Authorized by: Kurt Amaya DO     Consent:     Consent obtained:  Verbal    Consent given by:  Patient    Risks discussed:  Infection, poor cosmetic result and poor wound healing    Alternatives discussed:  No treatment and delayed treatment  Anesthesia (see MAR for exact dosages): Anesthesia method:  Nerve block    Block needle gauge:  25 G    Block anesthetic:  Lidocaine 1% WITH epi    Block injection procedure:  Anatomic landmarks identified, introduced needle, anatomic landmarks palpated, negative aspiration for blood and incremental injection    Block outcome:  Anesthesia achieved  Laceration details:     Location:  Finger    Finger location:  L long finger    Length (cm):  1    Depth (mm):  3  Repair type:     Repair type:  Simple  Exploration:     Hemostasis achieved with:  Direct pressure    Wound exploration: wound explored through full range of motion and entire depth of wound probed and visualized      Wound extent: no fascia violation noted, no muscle damage noted, no nerve damage noted and no tendon damage noted    Treatment:     Area cleansed with:  Soap and water    Amount of cleaning:  Extensive    Irrigation solution:  Tap water    Irrigation method:  Tap  Skin repair:     Repair method:  Sutures    Suture size:  5-0    Suture material:  Nylon    Suture technique:  Simple interrupted    Number of sutures:  3  Approximation:     Approximation:  Close  Post-procedure details:     Dressing:  Adhesive bandage    Patient tolerance of procedure: Tolerated well, no immediate complications        MEDICAL DECISION MAKING   Initial Assessment:   1. This is a 45-year-old male, presenting with laceration.   Physical exam significant for 1 cm laceration over the dorsum of the proximal phalanx of the left middle finger, bleeding controlled. Plan:    Patient declining x-ray   Tetanus up-to-date   Laceration closed with 3 sutures   Discharged home with strict return precautions, follow-up. ED RESULTS   Laboratory results:  Labs Reviewed - No data to display    Radiologic studies results:  XR FINGER LEFT (MIN 2 VIEWS)    (Results Pending)       ED Medications administered this visit:   Medications   lidocaine-EPINEPHrine 1 %-1:860648 injection 20 mL (has no administration in time range)         ED COURSE         Strict return precautions and follow up instructions were discussed with the patient prior to discharge, with which the patient agrees. MEDICATION CHANGES     New Prescriptions    No medications on file         FINAL DISPOSITION     Final diagnoses:   Laceration of left middle finger without foreign body without damage to nail, initial encounter     Condition: condition: good  Dispo: Discharge to home      This transcription was electronically signed. Parts of this transcriptions may have been dictated by use of voice recognition software and electronically transcribed, and parts may have been transcribed with the assistance of an ED scribe. The transcription may contain errors not detected in proofreading. Please refer to my supervising physician's documentation if my documentation differs.     Electronically Signed: Medhat Berger MD, 03/19/22, 1:50 AM         Medhat Berger MD  Resident  03/19/22 5097

## 2022-03-19 NOTE — ED NOTES
Pt states that he and his wife need to leave and get to bed. This RN updated patient on POC and waiting on XR result.       Venkatesh Diane RN  03/19/22 6435

## 2022-12-15 ENCOUNTER — HOSPITAL ENCOUNTER (EMERGENCY)
Age: 42
Discharge: HOME | End: 2022-12-15
Payer: COMMERCIAL

## 2022-12-15 VITALS
DIASTOLIC BLOOD PRESSURE: 105 MMHG | WEIGHT: 220 LBS | SYSTOLIC BLOOD PRESSURE: 164 MMHG | TEMPERATURE: 98.4 F | BODY MASS INDEX: 30.8 KG/M2 | HEART RATE: 72 BPM | RESPIRATION RATE: 18 BRPM | HEIGHT: 71 IN | OXYGEN SATURATION: 98 %

## 2022-12-15 DIAGNOSIS — H00.011 HORDEOLUM EXTERNUM OF RIGHT UPPER EYELID: Primary | ICD-10-CM

## 2022-12-15 PROCEDURE — 99283 EMERGENCY DEPT VISIT LOW MDM: CPT

## 2022-12-15 RX ORDER — ERYTHROMYCIN 5 MG/G
OINTMENT OPHTHALMIC
Qty: 3.5 G | Refills: 0 | Status: SHIPPED | OUTPATIENT
Start: 2022-12-15 | End: 2022-12-25

## 2022-12-15 ASSESSMENT — ENCOUNTER SYMPTOMS
DIARRHEA: 0
CHEST TIGHTNESS: 0
BACK PAIN: 0
ABDOMINAL DISTENTION: 0
CHOKING: 0
SHORTNESS OF BREATH: 0
NAUSEA: 0
EYE PAIN: 1
EYE DISCHARGE: 1
COUGH: 0
VOMITING: 0
ABDOMINAL PAIN: 0

## 2022-12-15 NOTE — ED TRIAGE NOTES
Pt to ED with c/c right eye problem. Pt states he thinks he got cleaning solution in this eye 4 months ago. Vitals stable.

## 2022-12-15 NOTE — ED PROVIDER NOTES
[facility] [address] [address] EMERGENCY DEPT  [address] [facility] [address]  Phone: [phone]        CHIEF COMPLAINT       Chief Complaint   Patient presents with    Eye Problem       Nurses Notes reviewed and I agree except as notedin the HPI. HISTORY OF PRESENT ILLNESS    Kimberly Nance is a [address] y.o. male who presents with bump on right eye. Patient states that he was in longterm 10 months ago where there was another client in the longterm that had aids and sterilization of the walls was necessary after an incident had occurred. After the sterilization took place the patient stated he went to lay down in the area and believed that some of the cleaning solution had invaded his eye which led to throbbing, pain, headaches and blurry vision. The patient states that for the past month the eye has not been throbbing or pain but the headaches and blurry vision have ensued. Patient states the he has not seen an ophthalmologist for this issue. Location/Symptom: right eye  Timing/Onset: \"couple months ago\"  Context/Setting: possible cleaning solution in eye  Quality: throbbing, painful, blurry  Duration: intermittent for the past few months  Modifying Factors: patient takes aleve and tylenol for headache  Severity: 6/10  REVIEW OF SYSTEMS     Review of Systems   Constitutional:  Negative for chills, diaphoresis, fatigue and fever. HENT:  Negative for congestion, ear discharge, ear pain and sneezing. Eyes:  Positive for pain, discharge and visual disturbance. Respiratory:  Negative for cough, choking, chest tightness and shortness of breath. Cardiovascular:  Negative for chest pain and palpitations. Gastrointestinal:  Negative for abdominal distention, abdominal pain, diarrhea, nausea and vomiting. Endocrine: Negative for cold intolerance and heat intolerance. Genitourinary:  Negative for difficulty urinating, dysuria, flank pain and hematuria.    Musculoskeletal:  Negative for back pain, gait problem and neck stiffness. Neurological:  Positive for headaches. Negative for dizziness, weakness, light-headedness and numbness. Psychiatric/Behavioral:  Negative for agitation and behavioral problems. PAST MEDICAL HISTORY    has a past medical history of Anxiety, CAD (coronary artery disease), Chronic back pain, Depression, Headache(784.0), Hypertension, Kidney stone, TBI (traumatic brain injury) ([facility] [address] and Unspecified sleep apnea. SURGICAL HISTORY      has a past surgical history that includes tracheostomy closure; Tracheotomy; and Plantar fascia surgery (Left, 7/10/2020). CURRENT MEDICATIONS       Discharge Medication List as of 12/15/2022 11:52 AM        CONTINUE these medications which have NOT CHANGED    Details   metoprolol succinate (TOPROL XL) 25 MG extended release tablet Take 25 mg by mouth dailyHistorical Med      cloNIDine (CATAPRES) 0.3 MG tablet Take 0.3 mg by mouth 2 times dailyHistorical Med      lamoTRIgine (LAMICTAL) 100 MG tablet Take 100 mg by mouth dailyHistorical Med      losartan (COZAAR) 50 MG tablet Take 50 mg by mouth dailyHistorical Med      hydrOXYzine (VISTARIL) 25 MG capsule Take 25 mg by mouth 3 times daily as needed for ItchingHistorical Med      tamsulosin (FLOMAX) 0.4 MG capsule Take 1 capsule by mouth daily, Disp-30 capsule, R-0Normal      ketorolac (TORADOL) 10 MG tablet Take 1 tablet by mouth every 6 hours as needed for Pain, Disp-20 tablet, R-0Print      rivaroxaban (XARELTO) 10 MG TABS tablet Take 1 tablet by mouth daily (with breakfast) for 14 days, Disp-14 tablet, R-0Print      DULoxetine (CYMBALTA) 60 MG extended release capsule Take 60 mg by mouth dailyHistorical Med      QUEtiapine (SEROQUEL) 25 MG tablet Take 25 mg by mouth nightlyHistorical Med      naproxen (NAPROSYN) 500 MG tablet Take 1 tablet by mouth 2 times daily for 10 days, Disp-20 tablet, R-0Print             ALLERGIES     is allergic to robitussin [guaifenesin].     HISTORY     He indicated that his mother is . He indicated that his father is alive. family history includes Diabetes in his mother; High Blood Pressure in his father. SOCIALHISTORY      reports that he has been smoking cigarettes. He started smoking about 8 years ago. He has been smoking an average of .5 packs per day. He has never used smokeless tobacco. He reports that he does not drink alcohol and does not use drugs. PHYSICAL EXAM     INITIAL VITALS:  height is 5' 11\" (1.803 m) and weight is 220 lb (99.8 kg). His oral temperature is 98.4 °F (36.9 °C). His blood pressure is 164/105 (abnormal) and his pulse is 72. His respiration is 18 and oxygen saturation is 98%. Physical Exam  Vitals and nursing note reviewed. Constitutional:       Comments: Well Developed Well Nourished Appearing     HENT:      Head: Normocephalic and atraumatic. Nose: Nose normal.      Mouth/Throat:      Mouth: Mucous membranes are moist.      Pharynx: Oropharynx is clear. Eyes:      Pupils: Pupils are equal, round, and reactive to light. Cardiovascular:      Rate and Rhythm: Normal rate and regular rhythm. Heart sounds: Normal heart sounds. Pulmonary:      Effort: Pulmonary effort is normal. No respiratory distress. Breath sounds: Normal breath sounds. No wheezing. Abdominal:      General: Bowel sounds are normal. There is no distension. Palpations: Abdomen is soft. Musculoskeletal:         General: Normal range of motion. Cervical back: Normal range of motion and neck supple. Skin:     General: Skin is warm and dry. Capillary Refill: Capillary refill takes less than 2 seconds. Neurological:      General: No focal deficit present. Mental Status: He is alert and oriented to person, place, and time. Psychiatric:         Mood and Affect: Mood normal.         Behavior: Behavior normal.         Thought Content: Thought content normal.         DIFFERENTIAL DIAGNOSIS:   External stye    DIAGNOSTIC RESULTS     EKG:  All EKG's are interpreted by the Emergency Department Physician who either signs or Co-signs this chart in the absence of a cardiologist.      RADIOLOGY: non-plain film images(s) such as CT, Ultrasound and MRI are read by the radiologist.  No orders to display         LABS:   Labs Reviewed - No data to display    EMERGENCY DEPARTMENT COURSE:   :    Vitals:    12/15/22 1056   BP: (!) 164/105   Pulse: 72   Resp: 18   Temp: 98.4 °F (36.9 °C)   TempSrc: Oral   SpO2: 98%   Weight: 220 lb (99.8 kg)   Height: 5' 11\" (1.803 m)     Patient was seen history physical exam was performed. See disposition below    CRITICAL CARE:  None    CONSULTS:  None    PROCEDURES:  None    FINAL IMPRESSION      1.  Hordeolum externum of right upper eyelid          DISPOSITION/PLAN   Discharge    PATIENT REFERRED TO:  Kristie Heck [address] [place] [address]  [phone]    In 2 days      Ritchieadrian [address] [place] [address]  [phone]    In 2 days      DISCHARGE MEDICATIONS:  Discharge Medication List as of 12/15/2022 11:52 AM        START taking these medications    Details   erythromycin (ROMYCIN) 5 MG/GM ophthalmic ointment Apply to area four times a day, Disp-3.5 g, R-0, Normal             (Please note that portions of this note were completed with a voice recognitionprogram.  Efforts were made to edit the dictations but occasionally words are mis-transcribed.)    Judy Mcdonnell, [address]  12/15/22 1912

## 2022-12-29 ENCOUNTER — HOSPITAL ENCOUNTER (EMERGENCY)
Age: 42
Discharge: HOME | End: 2022-12-29
Payer: COMMERCIAL

## 2022-12-29 VITALS
OXYGEN SATURATION: 99 % | HEART RATE: 81 BPM | TEMPERATURE: 99 F | DIASTOLIC BLOOD PRESSURE: 111 MMHG | SYSTOLIC BLOOD PRESSURE: 169 MMHG | RESPIRATION RATE: 18 BRPM

## 2022-12-29 DIAGNOSIS — H00.013 HORDEOLUM EXTERNUM OF RIGHT EYE, UNSPECIFIED EYELID: Primary | ICD-10-CM

## 2022-12-29 PROCEDURE — 99283 EMERGENCY DEPT VISIT LOW MDM: CPT

## 2022-12-29 RX ORDER — ERYTHROMYCIN 5 MG/G
OINTMENT OPHTHALMIC
Qty: 3.5 G | Refills: 1 | Status: SHIPPED | OUTPATIENT
Start: 2022-12-29 | End: 2023-01-08

## 2022-12-29 RX ORDER — ERYTHROMYCIN 5 MG/G
OINTMENT OPHTHALMIC
Qty: 3.5 G | Refills: 1 | Status: SHIPPED | OUTPATIENT
Start: 2022-12-29 | End: 2022-12-29 | Stop reason: SDUPTHER

## 2022-12-29 ASSESSMENT — ENCOUNTER SYMPTOMS
VOMITING: 0
NAUSEA: 0
DIARRHEA: 0
CHEST TIGHTNESS: 0
EYE PAIN: 0
RHINORRHEA: 0
WHEEZING: 0
EYE DISCHARGE: 0
BACK PAIN: 0
SORE THROAT: 0
COUGH: 0

## 2022-12-29 NOTE — ED PROVIDER NOTES
[address] [facility] [address] [address] EMERGENCY DEPT  [address] [facility] [address]  Phone: [phone]        CHIEF COMPLAINT       Chief Complaint   Patient presents with    Rash       Nurses Notes reviewed and I agree except as notedin the HPI. HISTORY OF PRESENT ILLNESS    Deana Morris is a 40 y.o. male who presents with continuation of Hordeolum externum of right upper eyelid. Patient was seen in ED at [address] [place] [address] on 12/15/2022 for the same issue where he was prescribed antibiotic and discharged home. Patient states that recently he believes his child threw away his medication by accident and is in need of more. Patient states that his eye is \"much better\" and just requests a refill on prescription to finish the regimen. REVIEW OF SYSTEMS     Review of Systems   Constitutional:  Negative for chills, fatigue and fever. HENT:  Negative for congestion, ear pain, rhinorrhea and sore throat. Eyes:  Negative for pain and discharge. Hordeolum externum of right upper eyelid   Respiratory:  Negative for cough, chest tightness and wheezing. Cardiovascular:  Negative for chest pain, palpitations and leg swelling. Gastrointestinal:  Negative for diarrhea, nausea and vomiting. Genitourinary:  Negative for dysuria, frequency and urgency. Musculoskeletal:  Negative for arthralgias, back pain, myalgias and neck pain. Skin:  Negative for rash. Neurological:  Negative for dizziness, weakness and headaches. All other systems reviewed and are negative. PAST MEDICAL HISTORY    has a past medical history of Anxiety, CAD (coronary artery disease), Chronic back pain, Depression, Headache(784.0), Hypertension, Kidney stone, TBI (traumatic brain injury) ([facility] [address] and Unspecified sleep apnea. SURGICAL HISTORY      has a past surgical history that includes tracheostomy closure; Tracheotomy; and Plantar fascia surgery (Left, 7/10/2020).     CURRENT MEDICATIONS       Discharge Medication List as of 12/29/2022 11:54 AM        CONTINUE these medications which have NOT CHANGED    Details   metoprolol succinate (TOPROL XL) 25 MG extended release tablet Take 25 mg by mouth dailyHistorical Med      cloNIDine (CATAPRES) 0.3 MG tablet Take 0.3 mg by mouth 2 times dailyHistorical Med      lamoTRIgine (LAMICTAL) 100 MG tablet Take 100 mg by mouth dailyHistorical Med      losartan (COZAAR) 50 MG tablet Take 50 mg by mouth dailyHistorical Med      hydrOXYzine (VISTARIL) 25 MG capsule Take 25 mg by mouth 3 times daily as needed for ItchingHistorical Med      tamsulosin (FLOMAX) 0.4 MG capsule Take 1 capsule by mouth daily, Disp-30 capsule, R-0Normal      ketorolac (TORADOL) 10 MG tablet Take 1 tablet by mouth every 6 hours as needed for Pain, Disp-20 tablet, R-0Print      rivaroxaban (XARELTO) 10 MG TABS tablet Take 1 tablet by mouth daily (with breakfast) for 14 days, Disp-14 tablet, R-0Print      DULoxetine (CYMBALTA) 60 MG extended release capsule Take 60 mg by mouth dailyHistorical Med      QUEtiapine (SEROQUEL) 25 MG tablet Take 25 mg by mouth nightlyHistorical Med      naproxen (NAPROSYN) 500 MG tablet Take 1 tablet by mouth 2 times daily for 10 days, Disp-20 tablet, R-0Print             ALLERGIES     is allergic to robitussin [guaifenesin]. HISTORY     He indicated that his mother is . He indicated that his father is alive. family history includes Diabetes in his mother; High Blood Pressure in his father. SOCIALHISTORY      reports that he has been smoking cigarettes. He started smoking about 8 years ago. He has been smoking an average of .5 packs per day. He has never used smokeless tobacco. He reports that he does not drink alcohol and does not use drugs. PHYSICAL EXAM     INITIAL VITALS:  oral temperature is 99 °F (37.2 °C). His blood pressure is 169/111 (abnormal) and his pulse is 81. His respiration is 18 and oxygen saturation is 99%. Physical Exam  Vitals and nursing note reviewed.    Constitutional: Appearance: He is well-developed. Comments: Non Toxic   HENT:      Head: Normocephalic and atraumatic. Eyes:      General:         Right eye: Hordeolum present. Pupils: Pupils are equal, round, and reactive to light. Comments: Hordeolum externum of right upper eyelid   Cardiovascular:      Rate and Rhythm: Normal rate. Pulmonary:      Effort: Pulmonary effort is normal.   Abdominal:      Palpations: Abdomen is soft. Musculoskeletal:         General: Normal range of motion. Cervical back: Normal range of motion and neck supple. Skin:     General: Skin is warm and dry. Neurological:      Mental Status: He is alert and oriented to person, place, and time. Psychiatric:         Behavior: Behavior normal.       DIFFERENTIAL DIAGNOSIS:   Stye    DIAGNOSTIC RESULTS     EKG: All EKG's are interpreted by the Emergency Department Physician who either signs or Co-signs this chart in the absence of a cardiologist.      RADIOLOGY: non-plain film images(s) such as CT, Ultrasound and MRI are read by the radiologist.  No orders to display         LABS:   Labs Reviewed - No data to display    EMERGENCY DEPARTMENT COURSE:   :    Vitals:    12/29/22 1139   BP: (!) 169/111   Pulse: 81   Resp: 18   Temp: 99 °F (37.2 °C)   TempSrc: Oral   SpO2: 99%     Patient was seen history physical exam was performed. See disposition below    CRITICAL CARE:  None    CONSULTS:  None    PROCEDURES:  None    FINAL IMPRESSION      1.  Hordeolum externum of right eye, unspecified eyelid          DISPOSITION/PLAN   Discharge    PATIENT REFERRED TO:  Vic Heck [address] [place] [address]  [phone]    In 2 days      DISCHARGE MEDICATIONS:  Discharge Medication List as of 12/29/2022 11:54 AM          (Please note that portions of this note were completed with a voice recognitionprogram.  Efforts were made to edit the dictations but occasionally words are mis-transcribed.)    Janusz Bustamante, [address] [place], [place]  12/29/22 0903

## 2022-12-29 NOTE — ED NOTES
Pt to the ED via self. Patient states that he was seen here and given a medication for a rash on his eye. Patient states that he didn't finish the medication and the redness is starting to return. Patient is alert and oriented x 4. Respirations regular and unlabored. Call light within reach.      Constance Enriquez RN  12/29/22 9612

## 2023-01-15 ENCOUNTER — HOSPITAL ENCOUNTER (EMERGENCY)
Age: 43
Discharge: HOME | End: 2023-01-15
Attending: STUDENT IN AN ORGANIZED HEALTH CARE EDUCATION/TRAINING PROGRAM
Payer: COMMERCIAL

## 2023-01-15 VITALS
SYSTOLIC BLOOD PRESSURE: 157 MMHG | BODY MASS INDEX: 30.8 KG/M2 | RESPIRATION RATE: 18 BRPM | DIASTOLIC BLOOD PRESSURE: 117 MMHG | OXYGEN SATURATION: 96 % | WEIGHT: 220 LBS | TEMPERATURE: 99 F | HEIGHT: 71 IN | HEART RATE: 110 BPM

## 2023-01-15 DIAGNOSIS — H66.001 NON-RECURRENT ACUTE SUPPURATIVE OTITIS MEDIA OF RIGHT EAR WITHOUT SPONTANEOUS RUPTURE OF TYMPANIC MEMBRANE: Primary | ICD-10-CM

## 2023-01-15 PROCEDURE — 99283 EMERGENCY DEPT VISIT LOW MDM: CPT

## 2023-01-15 RX ORDER — AMOXICILLIN AND CLAVULANATE POTASSIUM 875; 125 MG/1; MG/1
1 TABLET, FILM COATED ORAL 2 TIMES DAILY
Qty: 14 TABLET | Refills: 0 | Status: SHIPPED | OUTPATIENT
Start: 2023-01-15 | End: 2023-01-22

## 2023-01-15 ASSESSMENT — PAIN DESCRIPTION - ORIENTATION: ORIENTATION: RIGHT

## 2023-01-15 ASSESSMENT — PAIN SCALES - GENERAL: PAINLEVEL_OUTOF10: 7

## 2023-01-15 ASSESSMENT — PAIN DESCRIPTION - PAIN TYPE: TYPE: ACUTE PAIN

## 2023-01-15 ASSESSMENT — PAIN DESCRIPTION - LOCATION: LOCATION: EAR

## 2023-01-15 ASSESSMENT — PAIN - FUNCTIONAL ASSESSMENT: PAIN_FUNCTIONAL_ASSESSMENT: 0-10

## 2023-01-15 NOTE — ED PROVIDER NOTES
[facility] [address] [address] EMERGENCY DEPT      EMERGENCY MEDICINE     Pt Name: Karin Santana  MRN: [id number]  [place] 1980  Date of evaluation: 1/15/2023  Provider: Tabitha Jason DO  Supervising Physician: Dr. William Garcia       Chief Complaint   Patient presents with    Otalgia     History obtained from the patient. HISTORY OF PRESENT ILLNESS   Karin Santana is a pleasant 40 y.o. male who presents to the emergency department from from home, by private vehicle for evaluation of right ear pain. He states last week Sunday he had intermittent fevers. Ear pain started this past Thursday. States that his ear is extremely painful and has been draining dark brown/neon yellow pus. He denies putting any Q-tips in his ear however, he has been stuffing it with toilet paper. States that he has noticed some blood when he blows his nose. States that he has had a cough and his whole body hurts. He has ringing in his right ear. Denies any additional complaints. Pertinent ROS included above.   PASTMEDICAL HISTORY     Past Medical History:   Diagnosis Date    Anxiety     CAD (coronary artery disease)     Chronic back pain     Depression     Headache(784.0)     Hypertension     Kidney stone     TBI (traumatic brain injury)     Unspecified sleep apnea        Patient Active Problem List   Diagnosis Code    Anxiety F41.9    Back pain, chronic M54.9, G89.29     SURGICAL HISTORY       Past Surgical History:   Procedure Laterality Date    PLANTAR FASCIA SURGERY Left 7/10/2020    LEFT TIBIAL BONE MARROW HARVEST, LEFT TARSAL TUNNEL DECOMPRESSION, INSTEP PLANTAR FASCIOTOMY, POSTERIOR COMPARTMENT FASCIOTOMY, MORTONS NEUROMA performed by Kirk High DPM at [address] [place] [address]       Discharge Medication List as of 1/15/2023  5:53 PM        CONTINUE these medications which have NOT CHANGED    Details   metoprolol succinate (TOPROL XL) 25 MG extended release tablet Take 25 mg by mouth dailyHistorical Med      cloNIDine (CATAPRES) 0.3 MG tablet Take 0.3 mg by mouth 2 times dailyHistorical Med      lamoTRIgine (LAMICTAL) 100 MG tablet Take 100 mg by mouth dailyHistorical Med      losartan (COZAAR) 50 MG tablet Take 50 mg by mouth dailyHistorical Med      hydrOXYzine (VISTARIL) 25 MG capsule Take 25 mg by mouth 3 times daily as needed for ItchingHistorical Med      tamsulosin (FLOMAX) 0.4 MG capsule Take 1 capsule by mouth daily, Disp-30 capsule, R-0Normal      ketorolac (TORADOL) 10 MG tablet Take 1 tablet by mouth every 6 hours as needed for Pain, Disp-20 tablet, R-0Print      rivaroxaban (XARELTO) 10 MG TABS tablet Take 1 tablet by mouth daily (with breakfast) for 14 days, Disp-14 tablet, R-0Print      DULoxetine (CYMBALTA) 60 MG extended release capsule Take 60 mg by mouth dailyHistorical Med      QUEtiapine (SEROQUEL) 25 MG tablet Take 25 mg by mouth nightlyHistorical Med      naproxen (NAPROSYN) 500 MG tablet Take 1 tablet by mouth 2 times daily for 10 days, Disp-20 tablet, R-0Print             ALLERGIES     is allergic to robitussin [guaifenesin]. FAMILY HISTORY     He indicated that his mother is . He indicated that his father is alive.        SOCIAL HISTORY       Social History     Tobacco Use    Smoking status: Every Day     Packs/day: 0.50     Types: Cigarettes     Start date: 2014    Smokeless tobacco: Never   Vaping Use    Vaping Use: Never used   Substance Use Topics    Alcohol use: No    Drug use: No       PHYSICAL EXAM       ED Triage Vitals   BP Temp Temp Source Heart Rate Resp SpO2 Height Weight   01/15/23 1716 01/15/23 1713 01/15/23 1713 01/15/23 1713 01/15/23 1716 01/15/23 1713 01/15/23 1713 01/15/23 1713   (!) 157/117 99 °F (37.2 °C) Oral (!) 110 18 96 % 5' 11\" (1.803 m) 220 lb (99.8 kg)       Additional Vital Signs:  Vitals:    01/15/23 1716   BP: (!) 157/117   Pulse:    Resp: 18   Temp:    SpO2:      Physical Exam  Vitals and nursing note reviewed. Constitutional:       Appearance: Normal appearance. HENT:      Head: Normocephalic and atraumatic. Right Ear: Decreased hearing noted. Drainage present. No laceration, swelling or tenderness. No middle ear effusion. There is no impacted cerumen. No foreign body. No mastoid tenderness. Tympanic membrane is injected, erythematous and bulging. Tympanic membrane is not scarred, perforated or retracted. Tympanic membrane has normal mobility. Left Ear: Hearing, tympanic membrane, ear canal and external ear normal. No mastoid tenderness. Cardiovascular:      Rate and Rhythm: Regular rhythm. Tachycardia present. Pulses: Normal pulses. Heart sounds: Normal heart sounds. No murmur heard. No friction rub. No gallop. Pulmonary:      Effort: Pulmonary effort is normal. No respiratory distress. Breath sounds: Normal breath sounds. No stridor. No wheezing or rhonchi. Neurological:      Mental Status: He is alert. FORMAL DIAGNOSTIC RESULTS     RADIOLOGY: Interpretation per the Radiologist below, if available at the time of this note (none if blank): No orders to display       LABS: (none if blank)  Labs Reviewed - No data to display    (Any cultures that may have been sent were not resulted at the time of this patient visit)    [facility] / ED COURSE:     Assessment and Plan: This is a 40 y.o. male with no significant past medical history, presenting with right ear pain. Physical exam significant for decreased hearing, pus, injected tympanic membrane, erythematous tympanic membrane, bulging tympanic membrane on the right side. Differential diagnoses include, but not limited to otitis media. EKG shows N/A. Labs significant for NA. Imaging significant for N/A. Patient likely has otitis media.        Significant Clinical Scoring:  None         ED Reassessment: None            Case discussed with consulting clinician: None Shared Decision-Making was performed and disposition discussed with the        Patient/Family and questions answered         Social determinants of health impacting treatment or disposition: None         Code Status: Full          Vitals Reviewed:    Vitals:    01/15/23 1713 01/15/23 1716   BP:  (!) 157/117   Pulse: (!) 110    Resp:  18   Temp: 99 °F (37.2 °C)    TempSrc: Oral    SpO2: 96%    Weight: 220 lb (99.8 kg)    Height: 5' 11\" (1.803 m)        The patient was seen and examined. Appropriate diagnostic testing was performed and results reviewed with the patient. Nursing notes reviewed. The results of pertinent diagnostic studies and exam findings were discussed. The patients provisional diagnosis and plan of care were discussed with the patient and present family who expressed understanding. Any medications were reviewed and indications and risks of medications were discussed with the patient /family present. ED Medications administered this visit:  (None if blank)  Medications - No data to display      DISCHARGE PRESCRIPTIONS: (None if blank)  Discharge Medication List as of 1/15/2023  5:53 PM        START taking these medications    Details   amoxicillin-clavulanate (AUGMENTIN) 875-125 MG per tablet Take 1 tablet by mouth 2 times daily for 7 days, Disp-14 tablet, R-0Print             FINAL IMPRESSION      1. Non-recurrent acute suppurative otitis media of right ear without spontaneous rupture of tympanic membrane          DISPOSITION/PLAN     Final diagnoses:   Non-recurrent acute suppurative otitis media of right ear without spontaneous rupture of tympanic membrane     Condition: condition: good  Dispo: Discharge to home        OUTPATIENT FOLLOW UP THE PATIENT:    [facility] [address] [facility]  [phone]    In 1 week  ED follow-up for otitis media, given 7 days of Augmentin  Strict return precautions and follow-up discussed with patient.     This transcription was electronically signed. Parts of this transcriptions may have been dictated by use of voice recognition software and electronically transcribed, and parts may have been transcribed with the assistance of an ED scribe. The transcription may contain errors not detected in proofreading. Please refer to my supervising physician's documentation if my documentation differs.     Electronically Signed: Ad Cancino DO, 01/15/23, 11:12 PM      Ad Cancino DO  Resident  01/15/23 2068

## 2023-01-15 NOTE — ED TRIAGE NOTES
PT comes to ED with c/o fever and ear pain for 1 week. Pt states he has 7/10 ear pain and diminished hearing in right ear. PT is afebrile at this time. No distress is noted at this time.

## 2023-01-15 NOTE — Clinical Note
Laure Arguello was seen and treated in our emergency department on 1/15/2023. He may return to work on 01/18/2023. Please return to the ED if your symptoms worsen or change. If you have any questions or concerns, please don't hesitate to call.       [employer] [facility], DO

## 2023-05-09 NOTE — PROGRESS NOTES
1521 Awake and oriented on arrival to PACU with O2 3l NC , HOB elevated    Pt c/o # 7 Lt foot pain , Pt hypertensive  1525 medicated with Dilaudid 0.5 mg IV   1530 no change in pain medicated with Dilaudid 0.5 mg IV , pt states he needs to void , pt given urinal   1535 pt voided 300 ml , Pt remains hypertensive medicated with Hydralazine 10 mg IV   1540 BP unchanged , pain tolerable , medicated with Hydralazine 10 mg IV  1555 visiting with staff , denies any needs  1610 No  Change in BP Dr Eulalio rodriguez , ok with BP have pt fallow up with family doctor  1625 pt pain tolerable , BP unchanged   1630 meets criteria for discharge , transported to [facility] locker 14/with patient

## 2023-07-12 ENCOUNTER — HOSPITAL ENCOUNTER (EMERGENCY)
Age: 43
Discharge: HOME | End: 2023-07-12
Payer: COMMERCIAL

## 2023-07-12 ENCOUNTER — APPOINTMENT (OUTPATIENT)
Dept: CT IMAGING | Age: 43
End: 2023-07-12
Payer: COMMERCIAL

## 2023-07-12 VITALS
RESPIRATION RATE: 18 BRPM | TEMPERATURE: 97.9 F | SYSTOLIC BLOOD PRESSURE: 102 MMHG | HEART RATE: 72 BPM | OXYGEN SATURATION: 100 % | DIASTOLIC BLOOD PRESSURE: 64 MMHG
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
## 2023-07-12 DIAGNOSIS — M54.50 ACUTE MIDLINE LOW BACK PAIN WITHOUT SCIATICA: ICD-10-CM

## 2023-07-12 PROCEDURE — 99284 EMERGENCY DEPT VISIT MOD MDM: CPT

## 2023-07-12 PROCEDURE — 96372 THER/PROPH/DIAG INJ SC/IM: CPT

## 2023-07-12 PROCEDURE — 6370000000 HC RX 637 (ALT 250 FOR IP): Performed by: NURSE PRACTITIONER

## 2023-07-12 PROCEDURE — 72128 CT CHEST SPINE W/O DYE: CPT

## 2023-07-12 PROCEDURE — 6360000002 HC RX W HCPCS: Performed by: NURSE PRACTITIONER

## 2023-07-12 PROCEDURE — 72131 CT LUMBAR SPINE W/O DYE: CPT

## 2023-07-12 RX ORDER — KETOROLAC TROMETHAMINE 30 MG/ML
30 INJECTION, SOLUTION INTRAMUSCULAR; INTRAVENOUS ONCE
Status: COMPLETED | OUTPATIENT
Start: 2023-07-12 | End: 2023-07-12

## 2023-07-12 RX ORDER — LIDOCAINE 4 G/G
1 PATCH TOPICAL ONCE
Status: DISCONTINUED | OUTPATIENT
Start: 2023-07-12 | End: 2023-07-12 | Stop reason: HOSPADM

## 2023-07-12 RX ORDER — LORAZEPAM 2 MG/ML
1 INJECTION INTRAMUSCULAR ONCE
Status: COMPLETED | OUTPATIENT
Start: 2023-07-12 | End: 2023-07-12

## 2023-07-12 RX ADMIN — LORAZEPAM 1 MG: 2 INJECTION INTRAMUSCULAR; INTRAVENOUS at 16:53

## 2023-07-12 RX ADMIN — KETOROLAC TROMETHAMINE 30 MG: 30 INJECTION, SOLUTION INTRAMUSCULAR; INTRAVENOUS at 16:54

## 2023-07-12 ASSESSMENT — PAIN - FUNCTIONAL ASSESSMENT: PAIN_FUNCTIONAL_ASSESSMENT: 0-10

## 2023-07-12 ASSESSMENT — PAIN DESCRIPTION - LOCATION: LOCATION: BACK

## 2023-07-12 ASSESSMENT — PAIN SCALES - GENERAL: PAINLEVEL_OUTOF10: 9
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
## 2023-07-12 NOTE — ED TRIAGE NOTES
Pt presents to the ED for lower back pain after falling and landing on the corner of his bed frame early this morning.

## 2023-09-24 ENCOUNTER — HOSPITAL ENCOUNTER (EMERGENCY)
Age: 43
Discharge: HOME | End: 2023-09-24
Attending: EMERGENCY MEDICINE
Payer: COMMERCIAL

## 2023-09-24 VITALS
HEIGHT: 71 IN | TEMPERATURE: 98.1 F | WEIGHT: 220 LBS | SYSTOLIC BLOOD PRESSURE: 97 MMHG | OXYGEN SATURATION: 94 % | DIASTOLIC BLOOD PRESSURE: 72 MMHG | HEART RATE: 49 BPM | BODY MASS INDEX: 30.8 KG/M2 | RESPIRATION RATE: 16 BRPM

## 2023-09-24 DIAGNOSIS — R20.0 NUMBNESS: ICD-10-CM

## 2023-09-24 DIAGNOSIS — E86.0 DEHYDRATION: Primary | ICD-10-CM

## 2023-09-24 LAB
AMPHETAMINES UR QL SCN: NEGATIVE
ANION GAP SERPL CALC-SCNC: 12 MEQ/L (ref 8–16)
BARBITURATES UR QL SCN: NEGATIVE
BASOPHILS ABSOLUTE: 0 THOU/MM3 (ref 0–0.1)
BASOPHILS NFR BLD AUTO: 0.5 %
BENZODIAZ UR QL SCN: NEGATIVE
BUN SERPL-MCNC: 15 MG/DL (ref 7–22)
BZE UR QL SCN: NEGATIVE
CALCIUM SERPL-MCNC: 9.2 MG/DL (ref 8.5–10.5)
CANNABINOIDS UR QL SCN: NEGATIVE
CHLORIDE SERPL-SCNC: 99 MEQ/L (ref 98–111)
CO2 SERPL-SCNC: 24 MEQ/L (ref 23–33)
CREAT SERPL-MCNC: 1.3 MG/DL (ref 0.4–1.2)
DEPRECATED RDW RBC AUTO: 38.5 FL (ref 35–45)
EOSINOPHIL NFR BLD AUTO: 1 %
EOSINOPHILS ABSOLUTE: 0.1 THOU/MM3 (ref 0–0.4)
ERYTHROCYTE [DISTWIDTH] IN BLOOD BY AUTOMATED COUNT: 12.4 % (ref 11.5–14.5)
ETHANOL SERPL-MCNC: < 0.01 %
FENTANYL: NEGATIVE
GFR SERPL CREATININE-BSD FRML MDRD: > 60 ML/MIN/1.73M2
GLUCOSE SERPL-MCNC: 94 MG/DL (ref 70–108)
HCT VFR BLD AUTO: 43.2 % (ref 42–52)
HGB BLD-MCNC: 14.5 GM/DL (ref 14–18)
IMM GRANULOCYTES # BLD AUTO: 0.03 THOU/MM3 (ref 0–0.07)
IMM GRANULOCYTES NFR BLD AUTO: 0.4 %
LYMPHOCYTES ABSOLUTE: 1.4 THOU/MM3 (ref 1–4.8)
LYMPHOCYTES NFR BLD AUTO: 17.3 %
MAGNESIUM SERPL-MCNC: 1.9 MG/DL (ref 1.6–2.4)
MCH RBC QN AUTO: 29 PG (ref 26–33)
MCHC RBC AUTO-ENTMCNC: 33.6 GM/DL (ref 32.2–35.5)
MCV RBC AUTO: 86.4 FL (ref 80–94)
MONOCYTES ABSOLUTE: 0.7 THOU/MM3 (ref 0.4–1.3)
MONOCYTES NFR BLD AUTO: 8.1 %
NEUTROPHILS NFR BLD AUTO: 72.7 %
NRBC BLD AUTO-RTO: 0 /100 WBC
OPIATES UR QL SCN: NEGATIVE
OSMOLALITY SERPL CALC.SUM OF ELEC: 270.7 MOSMOL/KG (ref 275–300)
OXYCODONE: NEGATIVE
PCP UR QL SCN: NEGATIVE
PLATELET # BLD AUTO: 263 THOU/MM3 (ref 130–400)
PMV BLD AUTO: 10 FL (ref 9.4–12.4)
POTASSIUM SERPL-SCNC: 4 MEQ/L (ref 3.5–5.2)
RBC # BLD AUTO: 5 MILL/MM3 (ref 4.7–6.1)
SEGMENTED NEUTROPHILS ABSOLUTE COUNT: 5.9 THOU/MM3 (ref 1.8–7.7)
SODIUM SERPL-SCNC: 135 MEQ/L (ref 135–145)
WBC # BLD AUTO: 8.1 THOU/MM3 (ref 4.8–10.8)

## 2023-09-24 PROCEDURE — 99284 EMERGENCY DEPT VISIT MOD MDM: CPT

## 2023-09-24 PROCEDURE — 80307 DRUG TEST PRSMV CHEM ANLYZR: CPT

## 2023-09-24 PROCEDURE — 83735 ASSAY OF MAGNESIUM: CPT

## 2023-09-24 PROCEDURE — 85025 COMPLETE CBC W/AUTO DIFF WBC: CPT

## 2023-09-24 PROCEDURE — 82077 ASSAY SPEC XCP UR&BREATH IA: CPT

## 2023-09-24 PROCEDURE — 80048 BASIC METABOLIC PNL TOTAL CA: CPT

## 2023-09-24 PROCEDURE — 36415 COLL VENOUS BLD VENIPUNCTURE: CPT

## 2023-09-24 PROCEDURE — 2580000003 HC RX 258

## 2023-09-24 PROCEDURE — 96360 HYDRATION IV INFUSION INIT: CPT

## 2023-09-24 RX ORDER — 0.9 % SODIUM CHLORIDE 0.9 %
1000 INTRAVENOUS SOLUTION INTRAVENOUS ONCE
Status: COMPLETED | OUTPATIENT
Start: 2023-09-24 | End: 2023-09-24

## 2023-09-24 RX ADMIN — SODIUM CHLORIDE 1000 ML: 9 INJECTION, SOLUTION INTRAVENOUS at 21:16

## 2023-09-24 ASSESSMENT — PAIN SCALES - GENERAL
PAINLEVEL_OUTOF10: 7

## 2023-09-24 ASSESSMENT — PAIN - FUNCTIONAL ASSESSMENT: PAIN_FUNCTIONAL_ASSESSMENT: 0-10
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
## 2023-09-25 NOTE — ED PROVIDER NOTES
Transfer of Care Note:   Physician Signing out: Dr. Debbi August  Receiving Physician: Matt Moreno MD  Sign out time: 200      Brief history:  41 yo M h/o polysubstance abuse comes in for changes in mental status, slurring of speech. Took excess clonidine, gabapentin and Xanax. Mild hypotension. Appears somewhat dry. Items pending that need to be checked:  Recheck BP after fluids      Tentative Impression of patient:  Dehydration  Numbness  Medication overdose    Expected disposition of patient:  Pending results, discharged. Additional Assessment and results:   I have personally performed a face to face diagnostic evaluation on this patient. The patient's initial evaluation and plan have been discussed with the prior physician who initially evaluated the patient. Nursing Notes, Past Medical Hx, Past Surgical Hx, Social Hx, Allergies, vital signs and Family Hx were all reviewed. Vitals:    09/24/23 2238   BP: 97/72   Pulse: (!) 49   Resp: 16   Temp:    SpO2: 94%     Physical Exam  Constitutional:       Appearance: Normal appearance. He is not ill-appearing or diaphoretic. HENT:      Head: Normocephalic and atraumatic. Right Ear: External ear normal.      Left Ear: External ear normal.      Nose: Nose normal.   Eyes:      Conjunctiva/sclera: Conjunctivae normal.   Cardiovascular:      Rate and Rhythm: Normal rate. Pulmonary:      Effort: Pulmonary effort is normal.   Abdominal:      General: Abdomen is flat. Palpations: Abdomen is soft. Skin:     General: Skin is warm. Capillary Refill: Capillary refill takes less than 2 seconds. Neurological:      General: No focal deficit present. Mental Status: He is alert and oriented to person, place, and time. Mental status is at baseline.    Psychiatric:         Mood and Affect: Mood normal.         Behavior: Behavior normal.           Labs Reviewed   BASIC METABOLIC PANEL - Abnormal; Notable for the following components:
Mouth/Throat:      Mouth: Mucous membranes are dry. Eyes:      Extraocular Movements: Extraocular movements intact. Pupils: Pupils are equal, round, and reactive to light. Cardiovascular:      Rate and Rhythm: Regular rhythm. Bradycardia present. Pulses: Normal pulses. Pulmonary:      Effort: Pulmonary effort is normal.      Breath sounds: Normal breath sounds. Abdominal:      General: Bowel sounds are normal.      Tenderness: There is no abdominal tenderness. Musculoskeletal:         General: Tenderness present. No swelling. Cervical back: Normal range of motion. No rigidity. Skin:     General: Skin is warm. Neurological:      General: No focal deficit present. Comments: Symmetric smile  No tongue deviation  No pronator drift  Normal heel-to-shin test bilaterally           ED RESULTS   Laboratory results (none if blank):  Labs Reviewed   BASIC METABOLIC PANEL - Abnormal; Notable for the following components:       Result Value    Creatinine 1.3 (*)     All other components within normal limits   OSMOLALITY - Abnormal; Notable for the following components:    Osmolality Calc 270.7 (*)     All other components within normal limits   ETHANOL   CBC WITH AUTO DIFFERENTIAL   MAGNESIUM   ANION GAP   GLOMERULAR FILTRATION RATE, ESTIMATED   URINE DRUG SCREEN     All laboratory results are individually reviewed and interpreted by me in the clinical context of this patient. See ED course below for results interpretation if applicable. (Any cultures that may have been sent were not resulted at the time of this patient ED visit)      Radiologic studies results available at the moment of this note (None if blank): No orders to display     See ED course below for my interpretation if applicable. All radiology images independently reviewed by me in the clinical context of this patient, in addition to interpretation provided by the radiologist.      EKG interpretation (none if blank):   Not

## 2023-09-25 NOTE — ED NOTES
Pt able to stand at bedside and use urinal with no assistance. Pt then walked to sink to wash hands and then back to the bed. Pt updated on plan of care. Voiced no needs. Call light in reach.      Neva Waller RN  09/24/23 5254

## 2023-09-25 NOTE — ED NOTES
Pt given urinal and asked to provide urine specimen as soon as possible.       Josi Carson RN  09/24/23 2033

## 2023-09-25 NOTE — ED NOTES
Provider at bedside talking with pt at this time. Updated on plan of care. Pt states still unable to provide urine specimen. Call light in reach.      Serene Zheng RN  09/24/23 5175

## 2023-09-25 NOTE — DISCHARGE INSTRUCTIONS
You came to the ED with a chief complaint of back pain and numbness. Physical exam demonstrated weakness in your bilateral lower extremities with regain of strength on distractions. You subsequently attested to taking multiple medications this morning, including but not limited to clonidine, gabapentin, and lorazepam.  You appeared dehydrated on admission. You received a bolus of fluids. You are discharged with instructions to follow-up outpatient with your primary care doctor and the [facility]. You are discouraged from seeking medications from multiple different hospitals. You have been educated on the possible consequences of taking multiple different medications. Continues to drink a lot of water. If your symptoms worsen, do not hesitate to come back to the ED.

## 2023-09-25 NOTE — ED NOTES
Pt sitting in bed texting on phone. Updated on plan of care. Voiced no needs. Call light in reach.      Enrrique Herrera RN  09/24/23 4861

## 2023-12-03 ENCOUNTER — HOSPITAL ENCOUNTER (EMERGENCY)
Age: 43
Discharge: LEFT BEFORE DISCHARGE | End: 2023-12-03

## 2023-12-03 VITALS
HEART RATE: 71 BPM | SYSTOLIC BLOOD PRESSURE: 118 MMHG | OXYGEN SATURATION: 95 % | RESPIRATION RATE: 19 BRPM | TEMPERATURE: 97.8 F | DIASTOLIC BLOOD PRESSURE: 89 MMHG

## 2023-12-03 NOTE — ED NOTES
Pt to ED via intake with c/o left pointer finger laceration. Pt reports they cut it at [facility] in [facility]. Bleeding is controlled at this time. Pt VSS.       Espinoza Ma RN  12/03/23 4241

## 2023-12-08 ENCOUNTER — HOSPITAL ENCOUNTER (EMERGENCY)
Age: 43
Discharge: HOME | End: 2023-12-08
Attending: STUDENT IN AN ORGANIZED HEALTH CARE EDUCATION/TRAINING PROGRAM
Payer: COMMERCIAL

## 2023-12-08 VITALS
OXYGEN SATURATION: 97 % | TEMPERATURE: 98.1 F | RESPIRATION RATE: 18 BRPM | SYSTOLIC BLOOD PRESSURE: 187 MMHG | DIASTOLIC BLOOD PRESSURE: 116 MMHG | HEART RATE: 74 BPM

## 2023-12-08 DIAGNOSIS — H10.31 ACUTE BACTERIAL CONJUNCTIVITIS OF RIGHT EYE: Primary | ICD-10-CM

## 2023-12-08 PROCEDURE — 99283 EMERGENCY DEPT VISIT LOW MDM: CPT

## 2023-12-08 RX ORDER — OFLOXACIN 3 MG/ML
2 SOLUTION/ DROPS OPHTHALMIC 4 TIMES DAILY
Qty: 1 EACH | Refills: 0 | Status: SHIPPED | OUTPATIENT
Start: 2023-12-08 | End: 2023-12-18

## 2023-12-08 ASSESSMENT — PAIN DESCRIPTION - DESCRIPTORS: DESCRIPTORS: BURNING

## 2023-12-08 ASSESSMENT — PAIN DESCRIPTION - PAIN TYPE: TYPE: ACUTE PAIN

## 2023-12-08 ASSESSMENT — PAIN SCALES - GENERAL: PAINLEVEL_OUTOF10: 7

## 2023-12-08 ASSESSMENT — PAIN - FUNCTIONAL ASSESSMENT: PAIN_FUNCTIONAL_ASSESSMENT: 0-10

## 2023-12-09 NOTE — ED TRIAGE NOTES
Pt to ED from home with c/o right eye burning and redness. Pt states his wife thinks he has conjunctivitis. Pt rates pain a 7/10 and states that it's burning. Reports that it is affecting his eye sight to where he ran a crane at work into something. /116. Pt states he has hypertension but doesn't take the meds that are in the chart.

## 2023-12-09 NOTE — DISCHARGE INSTRUCTIONS
You were seen in the emergency department for bacterial conjunctivitis. Do not wear your contact lenses for 1 week. Follow-up with your optometrist in 3 days.   Return to emergency room immediately if your vision gets worse, you develop fevers, eye pain or if you have any other concerns

## 2024-01-21 RX ADMIN — LIDOCAINE HYDROCHLORIDE 2 ML: 10 INJECTION, SOLUTION EPIDURAL; INFILTRATION; INTRACAUDAL; PERINEURAL at 23:30

## 2024-01-22 ENCOUNTER — APPOINTMENT (OUTPATIENT)
Dept: GENERAL RADIOLOGY | Age: 44
DRG: 862 | End: 2024-01-22
Payer: COMMERCIAL

## 2024-01-22 ENCOUNTER — HOSPITAL ENCOUNTER (INPATIENT)
Age: 44
LOS: 1 days | Discharge: HOME | DRG: 862 | End: 2024-01-23
Attending: EMERGENCY MEDICINE
Payer: COMMERCIAL

## 2024-01-22 ENCOUNTER — APPOINTMENT (OUTPATIENT)
Dept: CT IMAGING | Age: 44
DRG: 862 | End: 2024-01-22
Payer: COMMERCIAL

## 2024-01-22 DIAGNOSIS — I16.1 HYPERTENSIVE EMERGENCY: Primary | ICD-10-CM

## 2024-01-22 LAB
ALBUMIN SERPL BCG-MCNC: 4.2 G/DL (ref 3.5–5.1)
ALP SERPL-CCNC: 64 U/L (ref 38–126)
ALT SERPL W/O P-5'-P-CCNC: 30 U/L (ref 11–66)
ANION GAP SERPL CALC-SCNC: 12 MEQ/L (ref 8–16)
APTT PPP: 32.2 SECONDS (ref 22–38)
AST SERPL-CCNC: 20 U/L (ref 5–40)
BASOPHILS ABSOLUTE: 0 THOU/MM3 (ref 0–0.1)
BASOPHILS NFR BLD AUTO: 0.6 %
BILIRUB CONJ SERPL-MCNC: < 0.2 MG/DL (ref 0–0.3)
BILIRUB SERPL-MCNC: < 0.2 MG/DL (ref 0.3–1.2)
BUN SERPL-MCNC: 8 MG/DL (ref 7–22)
CALCIUM SERPL-MCNC: 9.2 MG/DL (ref 8.5–10.5)
CHLORIDE SERPL-SCNC: 106 MEQ/L (ref 98–111)
CO2 SERPL-SCNC: 23 MEQ/L (ref 23–33)
CREAT SERPL-MCNC: 0.7 MG/DL (ref 0.4–1.2)
DEPRECATED RDW RBC AUTO: 38.9 FL (ref 35–45)
EOSINOPHIL NFR BLD AUTO: 1.6 %
EOSINOPHILS ABSOLUTE: 0.1 THOU/MM3 (ref 0–0.4)
ERYTHROCYTE [DISTWIDTH] IN BLOOD BY AUTOMATED COUNT: 13 % (ref 11.5–14.5)
ETHANOL SERPL-MCNC: < 0.01 %
GFR SERPL CREATININE-BSD FRML MDRD: > 60 ML/MIN/1.73M2
GLUCOSE BLD STRIP.AUTO-MCNC: 88 MG/DL (ref 70–108)
GLUCOSE CSF-MCNC: 59 MG/DL (ref 40–80)
GLUCOSE SERPL-MCNC: 92 MG/DL (ref 70–108)
HCT VFR BLD AUTO: 42.5 % (ref 42–52)
HGB BLD-MCNC: 13.9 GM/DL (ref 14–18)
IMM GRANULOCYTES # BLD AUTO: 0.01 THOU/MM3 (ref 0–0.07)
IMM GRANULOCYTES NFR BLD AUTO: 0.2 %
INR PPP: 1.02 (ref 0.85–1.13)
LYMPHOCYTES ABSOLUTE: 2.4 THOU/MM3 (ref 1–4.8)
LYMPHOCYTES NFR BLD AUTO: 36.6 %
MCH RBC QN AUTO: 27.5 PG (ref 26–33)
MCHC RBC AUTO-ENTMCNC: 32.7 GM/DL (ref 32.2–35.5)
MCV RBC AUTO: 84.2 FL (ref 80–94)
MONOCYTES ABSOLUTE: 0.7 THOU/MM3 (ref 0.4–1.3)
MONOCYTES NFR BLD AUTO: 10.1 %
NEUTROPHILS NFR BLD AUTO: 50.9 %
NRBC BLD AUTO-RTO: 0 /100 WBC
OSMOLALITY SERPL CALC.SUM OF ELEC: 279.2 MOSMOL/KG (ref 275–300)
PLATELET # BLD AUTO: 235 THOU/MM3 (ref 130–400)
PMV BLD AUTO: 9.9 FL (ref 9.4–12.4)
POC CREATININE WHOLE BLOOD: 0.7 MG/DL (ref 0.5–1.2)
POTASSIUM SERPL-SCNC: 4.1 MEQ/L (ref 3.5–5.2)
PROT CSF-MCNC: 50 MG/DL (ref 12–60)
PROT SERPL-MCNC: 7.7 G/DL (ref 6.1–8)
RBC # BLD AUTO: 5.05 MILL/MM3 (ref 4.7–6.1)
SEGMENTED NEUTROPHILS ABSOLUTE COUNT: 3.3 THOU/MM3 (ref 1.8–7.7)
SODIUM SERPL-SCNC: 141 MEQ/L (ref 135–145)
TROPONIN, HIGH SENSITIVITY: < 6 NG/L (ref 0–12)
WBC # BLD AUTO: 6.5 THOU/MM3 (ref 4.8–10.8)

## 2024-01-22 PROCEDURE — 93010 ELECTROCARDIOGRAM REPORT: CPT | Performed by: INTERNAL MEDICINE

## 2024-01-22 PROCEDURE — 70450 CT HEAD/BRAIN W/O DYE: CPT

## 2024-01-22 PROCEDURE — 70496 CT ANGIOGRAPHY HEAD: CPT

## 2024-01-22 PROCEDURE — 89051 BODY FLUID CELL COUNT: CPT

## 2024-01-22 PROCEDURE — 80048 BASIC METABOLIC PNL TOTAL CA: CPT

## 2024-01-22 PROCEDURE — 84484 ASSAY OF TROPONIN QUANT: CPT

## 2024-01-22 PROCEDURE — 85610 PROTHROMBIN TIME: CPT

## 2024-01-22 PROCEDURE — 6360000002 HC RX W HCPCS: Performed by: EMERGENCY MEDICINE

## 2024-01-22 PROCEDURE — 99285 EMERGENCY DEPT VISIT HI MDM: CPT

## 2024-01-22 PROCEDURE — 71045 X-RAY EXAM CHEST 1 VIEW: CPT

## 2024-01-22 PROCEDURE — 85730 THROMBOPLASTIN TIME PARTIAL: CPT

## 2024-01-22 PROCEDURE — 80307 DRUG TEST PRSMV CHEM ANLYZR: CPT

## 2024-01-22 PROCEDURE — 85025 COMPLETE CBC W/AUTO DIFF WBC: CPT

## 2024-01-22 PROCEDURE — 62270 DX LMBR SPI PNXR: CPT

## 2024-01-22 PROCEDURE — 82565 ASSAY OF CREATININE: CPT

## 2024-01-22 PROCEDURE — 009U3ZX DRAINAGE OF SPINAL CANAL, PERCUTANEOUS APPROACH, DIAGNOSTIC: ICD-10-PCS

## 2024-01-22 PROCEDURE — 36415 COLL VENOUS BLD VENIPUNCTURE: CPT

## 2024-01-22 PROCEDURE — 2580000003 HC RX 258: Performed by: EMERGENCY MEDICINE

## 2024-01-22 PROCEDURE — 70498 CT ANGIOGRAPHY NECK: CPT

## 2024-01-22 PROCEDURE — 96366 THER/PROPH/DIAG IV INF ADDON: CPT

## 2024-01-22 PROCEDURE — 82948 REAGENT STRIP/BLOOD GLUCOSE: CPT

## 2024-01-22 PROCEDURE — 6360000004 HC RX CONTRAST MEDICATION: Performed by: EMERGENCY MEDICINE

## 2024-01-22 PROCEDURE — 80076 HEPATIC FUNCTION PANEL: CPT

## 2024-01-22 PROCEDURE — 2500000003 HC RX 250 WO HCPCS

## 2024-01-22 PROCEDURE — 82077 ASSAY SPEC XCP UR&BREATH IA: CPT

## 2024-01-22 PROCEDURE — 87075 CULTR BACTERIA EXCEPT BLOOD: CPT

## 2024-01-22 PROCEDURE — 96365 THER/PROPH/DIAG IV INF INIT: CPT

## 2024-01-22 PROCEDURE — 82945 GLUCOSE OTHER FLUID: CPT

## 2024-01-22 PROCEDURE — 93005 ELECTROCARDIOGRAM TRACING: CPT | Performed by: EMERGENCY MEDICINE

## 2024-01-22 PROCEDURE — 87798 DETECT AGENT NOS DNA AMP: CPT

## 2024-01-22 PROCEDURE — 87205 SMEAR GRAM STAIN: CPT

## 2024-01-22 PROCEDURE — 96375 TX/PRO/DX INJ NEW DRUG ADDON: CPT

## 2024-01-22 PROCEDURE — 84157 ASSAY OF PROTEIN OTHER: CPT

## 2024-01-22 RX ORDER — SODIUM CHLORIDE 9 MG/ML
INJECTION, SOLUTION INTRAVENOUS CONTINUOUS
Status: DISCONTINUED | OUTPATIENT
Start: 2024-01-22 | End: 2024-01-23 | Stop reason: HOSPADM

## 2024-01-22 RX ORDER — FENTANYL CITRATE 50 UG/ML
50 INJECTION, SOLUTION INTRAMUSCULAR; INTRAVENOUS ONCE
Status: COMPLETED | OUTPATIENT
Start: 2024-01-22 | End: 2024-01-22

## 2024-01-22 RX ORDER — 0.9 % SODIUM CHLORIDE 0.9 %
500 INTRAVENOUS SOLUTION INTRAVENOUS ONCE
Status: COMPLETED | OUTPATIENT
Start: 2024-01-22 | End: 2024-01-23

## 2024-01-22 RX ORDER — LIDOCAINE HYDROCHLORIDE 10 MG/ML
INJECTION, SOLUTION EPIDURAL; INFILTRATION; INTRACAUDAL; PERINEURAL
Status: COMPLETED
Start: 2024-01-22 | End: 2024-01-21

## 2024-01-22 RX ORDER — LABETALOL HYDROCHLORIDE 5 MG/ML
20 INJECTION INTRAVENOUS ONCE
Status: COMPLETED | OUTPATIENT
Start: 2024-01-22 | End: 2024-01-22

## 2024-01-22 RX ORDER — FENTANYL CITRATE 50 UG/ML
50 INJECTION, SOLUTION INTRAMUSCULAR; INTRAVENOUS ONCE
Status: DISCONTINUED | OUTPATIENT
Start: 2024-01-22 | End: 2024-01-23

## 2024-01-22 RX ADMIN — FENTANYL CITRATE 50 MCG: 50 INJECTION INTRAMUSCULAR; INTRAVENOUS at 21:20

## 2024-01-22 RX ADMIN — IOPAMIDOL 80 ML: 755 INJECTION, SOLUTION INTRAVENOUS at 20:56

## 2024-01-22 RX ADMIN — LABETALOL HYDROCHLORIDE 10 MG: 5 INJECTION INTRAVENOUS at 21:10

## 2024-01-22 RX ADMIN — SODIUM CHLORIDE 5 MG/HR: 9 INJECTION, SOLUTION INTRAVENOUS at 22:02

## 2024-01-22 RX ADMIN — SODIUM CHLORIDE 500 ML: 9 INJECTION, SOLUTION INTRAVENOUS at 21:35

## 2024-01-22 RX ADMIN — LABETALOL HYDROCHLORIDE 10 MG: 5 INJECTION INTRAVENOUS at 21:35

## 2024-01-22 ASSESSMENT — PAIN - FUNCTIONAL ASSESSMENT
PAIN_FUNCTIONAL_ASSESSMENT: NONE - DENIES PAIN
PAIN_FUNCTIONAL_ASSESSMENT: 0-10

## 2024-01-22 ASSESSMENT — PAIN SCALES - GENERAL: PAINLEVEL_OUTOF10: 8

## 2024-01-23 ENCOUNTER — APPOINTMENT (OUTPATIENT)
Dept: MRI IMAGING | Age: 44
DRG: 862 | End: 2024-01-23
Payer: COMMERCIAL

## 2024-01-23 VITALS
DIASTOLIC BLOOD PRESSURE: 97 MMHG | WEIGHT: 218.03 LBS | OXYGEN SATURATION: 97 % | HEIGHT: 72 IN | SYSTOLIC BLOOD PRESSURE: 167 MMHG | RESPIRATION RATE: 14 BRPM | HEART RATE: 72 BPM | BODY MASS INDEX: 29.53 KG/M2 | TEMPERATURE: 97.8 F

## 2024-01-23 PROBLEM — R51.9 HEADACHE: Status: ACTIVE | Noted: 2024-01-23

## 2024-01-23 LAB
AMPHETAMINES UR QL SCN: NEGATIVE
ANION GAP SERPL CALC-SCNC: 10 MEQ/L (ref 8–16)
BARBITURATES UR QL SCN: NEGATIVE
BENZODIAZ UR QL SCN: NEGATIVE
BUN SERPL-MCNC: 6 MG/DL (ref 7–22)
BZE UR QL SCN: NEGATIVE
CALCIUM SERPL-MCNC: 9.1 MG/DL (ref 8.5–10.5)
CANNABINOIDS UR QL SCN: NEGATIVE
CHARACTER, CSF: CLEAR
CHLORIDE SERPL-SCNC: 104 MEQ/L (ref 98–111)
CMV DNA CSF QL NAA+PROBE: NOT DETECTED
CO2 SERPL-SCNC: 27 MEQ/L (ref 23–33)
COLOR CSF: COLORLESS
CREAT SERPL-MCNC: 0.8 MG/DL (ref 0.4–1.2)
CRYPTOC AG CSF QL: NOT DETECTED
E COLI K1 AG CSF QL: NOT DETECTED
EV RNA CSF QL NAA+PROBE: NOT DETECTED
FENTANYL: NEGATIVE
GFR SERPL CREATININE-BSD FRML MDRD: > 60 ML/MIN/1.73M2
GLUCOSE SERPL-MCNC: 121 MG/DL (ref 70–108)
GP B STREP DNA SPEC QL NAA+PROBE: NOT DETECTED
HAEM INFLU DNA SPEC QL NAA+PROBE: NOT DETECTED
HHV6 DNA CSF QL NAA+PROBE: NOT DETECTED
HSV1 DNA CSF QL NAA+PROBE: NOT DETECTED
HSV2 DNA CSF QL NAA+PROBE: NOT DETECTED
L MONOCYTOG DNA SPEC QL NAA+PROBE: NOT DETECTED
LYMPHOCYTES NFR CSF MANUAL: 67 % (ref 0–90)
MAGNESIUM SERPL-MCNC: 2 MG/DL (ref 1.6–2.4)
MONOS+MACROS NFR CSF MANUAL: 27 % (ref 0–45)
N MEN DNA SPEC QL NAA+PROBE: NOT DETECTED
NEUTS SEG NFR CSF MANUAL: 6 % (ref 0–6)
NUC CELL # FLD AUTO: 1 /CUMM (ref 0–5)
OPIATES UR QL SCN: NEGATIVE
OSMOLALITY SERPL CALC.SUM OF ELEC: 280.1 MOSMOL/KG (ref 275–300)
OXYCODONE: NEGATIVE
PARECHOVIRUS A RNA SPEC QL NAA+PROBE: NOT DETECTED
PATHOLOGIST REVIEW: NORMAL
PCP UR QL SCN: NEGATIVE
POTASSIUM SERPL-SCNC: 3.6 MEQ/L (ref 3.5–5.2)
RBC # CSF MANUAL: 1 /CUMM
S PNEUM DNA SPEC QL NAA+PROBE: NOT DETECTED
SODIUM SERPL-SCNC: 141 MEQ/L (ref 135–145)
TUBE VOLUME RECEIVED CSF: 3.8 ML
VZV DNA CSF QL NAA+PROBE: NOT DETECTED

## 2024-01-23 PROCEDURE — 6360000002 HC RX W HCPCS

## 2024-01-23 PROCEDURE — 99223 1ST HOSP IP/OBS HIGH 75: CPT

## 2024-01-23 PROCEDURE — 2060000000 HC ICU INTERMEDIATE R&B

## 2024-01-23 PROCEDURE — 2580000003 HC RX 258: Performed by: EMERGENCY MEDICINE

## 2024-01-23 PROCEDURE — 6370000000 HC RX 637 (ALT 250 FOR IP)

## 2024-01-23 PROCEDURE — 99235 HOSP IP/OBS SAME DATE MOD 70: CPT | Performed by: HOSPITALIST

## 2024-01-23 PROCEDURE — 80048 BASIC METABOLIC PNL TOTAL CA: CPT

## 2024-01-23 PROCEDURE — 70551 MRI BRAIN STEM W/O DYE: CPT

## 2024-01-23 PROCEDURE — 36415 COLL VENOUS BLD VENIPUNCTURE: CPT

## 2024-01-23 PROCEDURE — 83735 ASSAY OF MAGNESIUM: CPT

## 2024-01-23 RX ORDER — SODIUM CHLORIDE 0.9 % (FLUSH) 0.9 %
5-40 SYRINGE (ML) INJECTION PRN
Status: DISCONTINUED | OUTPATIENT
Start: 2024-01-23 | End: 2024-01-23 | Stop reason: HOSPADM

## 2024-01-23 RX ORDER — LORAZEPAM 2 MG/ML
0.5 INJECTION INTRAMUSCULAR EVERY 6 HOURS PRN
Status: DISCONTINUED | OUTPATIENT
Start: 2024-01-23 | End: 2024-01-23 | Stop reason: HOSPADM

## 2024-01-23 RX ORDER — SODIUM CHLORIDE 0.9 % (FLUSH) 0.9 %
5-40 SYRINGE (ML) INJECTION EVERY 12 HOURS SCHEDULED
Status: DISCONTINUED | OUTPATIENT
Start: 2024-01-23 | End: 2024-01-23 | Stop reason: HOSPADM

## 2024-01-23 RX ORDER — LAMOTRIGINE 100 MG/1
100 TABLET ORAL DAILY
Status: DISCONTINUED | OUTPATIENT
Start: 2024-01-23 | End: 2024-01-23 | Stop reason: HOSPADM

## 2024-01-23 RX ORDER — DULOXETIN HYDROCHLORIDE 60 MG/1
60 CAPSULE, DELAYED RELEASE ORAL DAILY
Status: DISCONTINUED | OUTPATIENT
Start: 2024-01-23 | End: 2024-01-23 | Stop reason: HOSPADM

## 2024-01-23 RX ORDER — PREGABALIN 200 MG/1
200 CAPSULE ORAL DAILY
COMMUNITY

## 2024-01-23 RX ORDER — HYDRALAZINE HYDROCHLORIDE 25 MG/1
25 TABLET, FILM COATED ORAL EVERY 8 HOURS SCHEDULED
Status: DISCONTINUED | OUTPATIENT
Start: 2024-01-23 | End: 2024-01-23

## 2024-01-23 RX ORDER — QUETIAPINE FUMARATE 25 MG/1
25 TABLET, FILM COATED ORAL NIGHTLY
Status: DISCONTINUED | OUTPATIENT
Start: 2024-01-23 | End: 2024-01-23 | Stop reason: HOSPADM

## 2024-01-23 RX ORDER — LORAZEPAM 2 MG/ML
1 INJECTION INTRAMUSCULAR ONCE
Status: DISCONTINUED | OUTPATIENT
Start: 2024-01-23 | End: 2024-01-23 | Stop reason: HOSPADM

## 2024-01-23 RX ORDER — ACETAMINOPHEN 325 MG/1
650 TABLET ORAL EVERY 4 HOURS PRN
Status: DISCONTINUED | OUTPATIENT
Start: 2024-01-23 | End: 2024-01-23 | Stop reason: HOSPADM

## 2024-01-23 RX ORDER — LOSARTAN POTASSIUM 50 MG/1
50 TABLET ORAL DAILY
Status: DISCONTINUED | OUTPATIENT
Start: 2024-01-23 | End: 2024-01-23 | Stop reason: HOSPADM

## 2024-01-23 RX ORDER — ONDANSETRON 2 MG/ML
4 INJECTION INTRAMUSCULAR; INTRAVENOUS EVERY 6 HOURS PRN
Status: DISCONTINUED | OUTPATIENT
Start: 2024-01-23 | End: 2024-01-23 | Stop reason: HOSPADM

## 2024-01-23 RX ORDER — METOPROLOL TARTRATE 1 MG/ML
5 INJECTION, SOLUTION INTRAVENOUS EVERY 6 HOURS PRN
Status: DISCONTINUED | OUTPATIENT
Start: 2024-01-23 | End: 2024-01-23

## 2024-01-23 RX ORDER — ONDANSETRON 4 MG/1
4 TABLET, ORALLY DISINTEGRATING ORAL EVERY 8 HOURS PRN
Status: DISCONTINUED | OUTPATIENT
Start: 2024-01-23 | End: 2024-01-23 | Stop reason: HOSPADM

## 2024-01-23 RX ORDER — TAMSULOSIN HYDROCHLORIDE 0.4 MG/1
0.4 CAPSULE ORAL DAILY
Status: DISCONTINUED | OUTPATIENT
Start: 2024-01-23 | End: 2024-01-23

## 2024-01-23 RX ORDER — METOPROLOL SUCCINATE 25 MG/1
25 TABLET, EXTENDED RELEASE ORAL DAILY
Status: DISCONTINUED | OUTPATIENT
Start: 2024-01-23 | End: 2024-01-23 | Stop reason: HOSPADM

## 2024-01-23 RX ORDER — METOPROLOL TARTRATE 1 MG/ML
5 INJECTION, SOLUTION INTRAVENOUS EVERY 6 HOURS PRN
Status: ACTIVE | OUTPATIENT
Start: 2024-01-23 | End: 2024-01-23

## 2024-01-23 RX ORDER — POLYETHYLENE GLYCOL 3350 17 G/17G
17 POWDER, FOR SOLUTION ORAL DAILY PRN
Status: DISCONTINUED | OUTPATIENT
Start: 2024-01-23 | End: 2024-01-23 | Stop reason: HOSPADM

## 2024-01-23 RX ORDER — SODIUM CHLORIDE 9 MG/ML
INJECTION, SOLUTION INTRAVENOUS PRN
Status: DISCONTINUED | OUTPATIENT
Start: 2024-01-23 | End: 2024-01-23 | Stop reason: HOSPADM

## 2024-01-23 RX ADMIN — ACETAMINOPHEN 650 MG: 325 TABLET ORAL at 10:06

## 2024-01-23 RX ADMIN — SODIUM CHLORIDE: 9 INJECTION, SOLUTION INTRAVENOUS at 01:38

## 2024-01-23 RX ADMIN — HYDROMORPHONE HYDROCHLORIDE 0.25 MG: 1 INJECTION, SOLUTION INTRAMUSCULAR; INTRAVENOUS; SUBCUTANEOUS at 05:55

## 2024-01-23 RX ADMIN — ACETAMINOPHEN 650 MG: 325 TABLET ORAL at 03:30

## 2024-01-23 ASSESSMENT — PAIN DESCRIPTION - DESCRIPTORS
DESCRIPTORS: THROBBING
DESCRIPTORS: ACHING;DISCOMFORT
DESCRIPTORS: ACHING;SHOOTING

## 2024-01-23 ASSESSMENT — VISUAL ACUITY: VA_NORMAL: 1

## 2024-01-23 ASSESSMENT — ENCOUNTER SYMPTOMS
SORE THROAT: 0
SHORTNESS OF BREATH: 0
PHOTOPHOBIA: 1
ABDOMINAL PAIN: 0
COUGH: 0
DIARRHEA: 0
VOMITING: 0
NAUSEA: 0

## 2024-01-23 ASSESSMENT — PAIN DESCRIPTION - ORIENTATION
ORIENTATION: MID
ORIENTATION: MID

## 2024-01-23 ASSESSMENT — PAIN SCALES - GENERAL
PAINLEVEL_OUTOF10: 7
PAINLEVEL_OUTOF10: 0
PAINLEVEL_OUTOF10: 6
PAINLEVEL_OUTOF10: 3
PAINLEVEL_OUTOF10: 5
PAINLEVEL_OUTOF10: 5
PAINLEVEL_OUTOF10: 0

## 2024-01-23 ASSESSMENT — PAIN DESCRIPTION - LOCATION
LOCATION: HEAD

## 2024-01-23 ASSESSMENT — PAIN - FUNCTIONAL ASSESSMENT
PAIN_FUNCTIONAL_ASSESSMENT: ACTIVITIES ARE NOT PREVENTED

## 2024-01-23 NOTE — CONSULTS
facility-administered medications on file prior to encounter.     Current Outpatient Medications on File Prior to Encounter   Medication Sig Dispense Refill    pregabalin (LYRICA) 200 MG capsule Take 1 capsule by mouth daily. As needed Max Daily Amount: 200 mg      metoprolol succinate (TOPROL XL) 25 MG extended release tablet Take 1 tablet by mouth daily      cloNIDine (CATAPRES) 0.3 MG tablet Take 1 tablet by mouth 2 times daily      lamoTRIgine (LAMICTAL) 100 MG tablet Take 1 tablet by mouth daily      losartan (COZAAR) 50 MG tablet Take 1 tablet by mouth daily      hydrOXYzine (VISTARIL) 25 MG capsule Take 25 mg by mouth 3 times daily as needed for Itching (Patient not taking: Reported on 1/23/2024)      tamsulosin (FLOMAX) 0.4 MG capsule Take 1 capsule by mouth daily (Patient not taking: Reported on 1/23/2024) 30 capsule 0    ketorolac (TORADOL) 10 MG tablet Take 1 tablet by mouth every 6 hours as needed for Pain 20 tablet 0    rivaroxaban (XARELTO) 10 MG TABS tablet Take 1 tablet by mouth daily (with breakfast) for 14 days 14 tablet 0    DULoxetine (CYMBALTA) 60 MG extended release capsule Take 1 capsule by mouth daily      QUEtiapine (SEROQUEL) 25 MG tablet Take 1 tablet by mouth nightly      naproxen (NAPROSYN) 500 MG tablet Take 1 tablet by mouth 2 times daily for 10 days 20 tablet 0     Past History    Past Medical History:   has a past medical history of Anxiety, CAD (coronary artery disease), Chronic back pain, Depression, Headache(784.0), Hypertension, Kidney stone, TBI (traumatic brain injury) ([facility]), and Unspecified sleep apnea.    Social History:   reports that he has been smoking cigarettes. He started smoking about 10 years ago. He has a 5.0 pack-year smoking history. He has never used smokeless tobacco. He reports that he does not drink alcohol and does not use drugs.     Family History:   Family History   Problem Relation Age of Onset    Diabetes Mother     High Blood Pressure Father

## 2024-01-23 NOTE — PROGRESS NOTES
Patient/family has been educated on their personal risk factors of:  Hypertension  previous TIA    They have been given hand outs on the following medications:( give handouts/attach to AVS)   asa  Plavix  coumadin  statins(Specify)  antihypertensive(specify)    Treatment for stroke includes:  Risk factor modifications  Following the medication regime prescribed by physician      Educated on FAST-Face-Arm-Speech-Time    A stroke is a brain attack.Stroke is a brain injury. It occurs when the brain's blood supply is interrupted. Blood carries oxygen and nutrients to the brain. Without oxygen and nutrients from blood, brain tissue starts to die rapidly. This can happen in less than 10 minutes.    A stroke occurs when blood flow to the brain is blocked (called ischemic stroke). This is caused by one of the following:   Sudden decreased blood flow   Damage to a blood vessel supplying blood to the brain can occur suddenly from either:   Injury   A clot that forms and breaks off from another part of the body (such as the heart or neck)   There are certain conditions which predispose people to form blood clots, such as:   Cancer   Pregnancy   Atrial fibrillation   Certain autoimmune diseases   Local blood clot   A build-up of fatty substances ( atherosclerotic plaque ) along the inner lining of the artery causes:   Narrowing of artery   Reduced elasticity   Local inflammation   Blood protein defects leading to increased clotting tendency   Decreased blood flow in the artery   Clot in an artery supplying the brain   Inflammatory conditions in the blood vessels (vasculitis)   A stroke may also occur if a blood vessel breaks and bleeds into or around the brain. This is called hemorrhagic stroke.      This condition needs to be monitored closely. Be sure to keep all appointments. Have exams and blood tests done as directed.     Call 911 If Any of the Following Occurs   It is important that you and those around you know the

## 2024-01-23 NOTE — ED NOTES
Spoke to VIRGINIA Hylton to approve pt transport to [facility] in stable condition.

## 2024-01-23 NOTE — PROGRESS NOTES
Patient admitted to 4A Room 4 ambulatory  No complaints upon arrival to the room.  IV site free of s/s of infection or infiltration.   Vital signs obtained. Assessment and data collection initiated. Oriented to room. Policies and procedures for 4A explained All questions answered with no further questions at this time. Fall prevention and safety brochure discussed with patient. 2 person skin check completed with Meg MENCHACA.    Patient declines PCP notification.  Patient declines family notification.

## 2024-01-23 NOTE — DISCHARGE SUMMARY
Hospital Medicine Discharge Summary      Patient Identification:   Javid Valadez   : 1980  MRN: [id number]   Account: [id number]      Patient's PCP: Johnson Broussard    Admit Date: 2024     Discharge Date:   2024      Admitting Physician: No admitting provider for patient encounter.     Discharge Physician: Mark Anthony Barba MD     Discharge Diagnoses:    Active Hospital Problems    Diagnosis Date Noted    Headache [R51.9] 2024       The patient was seen and examined on day of discharge and this discharge summary is in conjunction with any daily progress note from day of discharge.    Hospital Course:   Javid Valadez is a 43 y.o. male admitted to [facility] on 2024 for evaluation and management of headache. Pt was also assessed by neurology service. Pt responded well to medical management, remained clinically stable and was discharged in stable conditions after cleared by consulting services.          Assessment/Plan:    - Severe headache: improved; negative for SAH. LP non-contributory. Post-concussion over others (migraine, tension, etc). F/U w/ PCP/Neuro. Pt already has OP F/U.    - Query Hx of sezure: post TBI? No breakthrough seizures. Home Lamotrigine resumed at dsicharge per Neurology w/ OP F/U    - Hx of HTN w/ accelerated HTN/ hypertensive urgency: likely non-complaince +/- pain? improved w/ resuming home meds which were continued at discharge. Pt and PCP need to monitor BP with antihypertensive regimen adjustment as needed     Questionable LLE DVT: Stable. This is per patient and he believes this is why he takes DOAC.. Unable to find any documentation or imaging to support this. Per fill history, last fill was 20. PCP to follow up and reconciliate appropriately    Query BPH: Stable. Not on tamsulosin per pharmacy; unusual given age; d/c'ed tamsulosin. PCP to follow up      - Severe Anxiety c/b Panic Attacks / Mood Disorder / Undifferentiated

## 2024-01-23 NOTE — CARE COORDINATION
Case Management Assessment  Initial Evaluation    Date/Time of Evaluation: 1/23/2024 11:55 AM  Assessment Completed by: Sobia Lassiter RN    If patient is discharged prior to next notation, then this note serves as note for discharge by case management.    Patient Name: Javid Valadez                   YOB: 1980  Diagnosis: Hypertensive emergency [I16.1]  Headache [R51.9]                   Date / Time: 1/22/2024  7:43 PM  Location: [address] [place] [address]     Patient Admission Status: Inpatient   Readmission Risk Low 0-14, Mod 15-19), High > 20: Readmission Risk Score: 10.8    Current PCP: Johnson Broussard  PCP verified by CM? Yes    Chart Reviewed: Yes      History Provided by: Patient  Patient Orientation: Alert and Oriented    Patient Cognition: Alert    Hospitalization in the last 30 days (Readmission):  No    If yes, Readmission Assessment in CM Navigator will be completed.    Advance Directives:      Code Status: Full Code   Patient's Primary Decision Maker is: Patient Declined (Legal Next of Kin Remains as Decision Maker)      Discharge Planning:    Patient lives with: Spouse/Significant Other Type of Home: House  Primary Care Giver: Self  Patient Support Systems include: Spouse/Significant Other   Current Financial resources: Medicaid  Current community resources: None  Current services prior to admission: None            Current DME:              Type of Home Care services:  None    ADLS  Prior functional level: Independent in ADLs/IADLs  Current functional level: Independent in ADLs/IADLs    Family can provide assistance at DC: Yes  Would you like Case Management to discuss the discharge plan with any other family members/significant others, and if so, who? No  Plans to Return to Present Housing: Yes  Other Identified Issues/Barriers to RETURNING to current housing: medical complications  Potential Assistance needed at discharge: N/A            Potential DME:    Patient expects to

## 2024-01-23 NOTE — PLAN OF CARE
Problem: Discharge Planning  Goal: Discharge to home or other facility with appropriate resources  Outcome: Progressing  Flowsheets (Taken 1/23/2024 0413)  Discharge to home or other facility with appropriate resources: Identify barriers to discharge with patient and caregiver     Problem: Pain  Goal: Verbalizes/displays adequate comfort level or baseline comfort level  Outcome: Progressing  Flowsheets (Taken 1/23/2024 0413)  Verbalizes/displays adequate comfort level or baseline comfort level:   Encourage patient to monitor pain and request assistance   Assess pain using appropriate pain scale     Problem: Safety - Adult  Goal: Free from fall injury  Outcome: Progressing  Flowsheets (Taken 1/23/2024 0413)  Free From Fall Injury: Instruct family/caregiver on patient safety     Problem: Neurosensory - Adult  Goal: Achieves stable or improved neurological status  Outcome: Progressing  Flowsheets (Taken 1/23/2024 0413)  Achieves stable or improved neurological status:   Assess for and report changes in neurological status   Initiate measures to prevent increased intracranial pressure  Goal: Absence of seizures  Outcome: Progressing  Flowsheets (Taken 1/23/2024 0413)  Absence of seizures:   Monitor for seizure activity.  If seizure occurs, document type and location of movements and any associated apnea   If seizure occurs, turn head to side and suction secretions as needed  Goal: Achieves maximal functionality and self care  Outcome: Progressing  Flowsheets (Taken 1/23/2024 0413)  Achieves maximal functionality and self care: Monitor swallowing and airway patency with patient fatigue and changes in neurological status     Problem: Skin/Tissue Integrity - Adult  Goal: Skin integrity remains intact  Outcome: Progressing  Flowsheets (Taken 1/23/2024 0413)  Skin Integrity Remains Intact:   Assess vascular access sites hourly   Monitor for areas of redness and/or skin breakdown     Problem: Musculoskeletal - Adult  Goal: 
Assess for and report changes in neurological status   Initiate measures to prevent increased intracranial pressure  Goal: Absence of seizures  1/23/2024 1106 by Shania Murray RN  Outcome: Adequate for Discharge  1/23/2024 0413 by Anjali Zuniga RN  Outcome: Progressing  Flowsheets (Taken 1/23/2024 0413)  Absence of seizures:   Monitor for seizure activity.  If seizure occurs, document type and location of movements and any associated apnea   If seizure occurs, turn head to side and suction secretions as needed  Goal: Achieves maximal functionality and self care  1/23/2024 1106 by Shania Murray RN  Outcome: Adequate for Discharge  1/23/2024 0413 by Anjali Zuniga RN  Outcome: Progressing  Flowsheets (Taken 1/23/2024 0413)  Achieves maximal functionality and self care: Monitor swallowing and airway patency with patient fatigue and changes in neurological status     Problem: Skin/Tissue Integrity - Adult  Goal: Skin integrity remains intact  1/23/2024 1106 by Shania Murray RN  Outcome: Adequate for Discharge  1/23/2024 0413 by Anjali Zuniga RN  Outcome: Progressing  Flowsheets (Taken 1/23/2024 0413)  Skin Integrity Remains Intact:   Assess vascular access sites hourly   Monitor for areas of redness and/or skin breakdown     Problem: Musculoskeletal - Adult  Goal: Return ADL status to a safe level of function  1/23/2024 1106 by Shania Murray RN  Outcome: Adequate for Discharge  1/23/2024 0413 by Anjali Zuniga RN  Outcome: Progressing  Flowsheets (Taken 1/23/2024 0413)  Return ADL Status to a Safe Level of Function:   Administer medication as ordered   Assess activities of daily living deficits and provide assistive devices as needed

## 2024-01-23 NOTE — ED TRIAGE NOTES
Pt presents to the ED with complaints of a headache and dizziness that started today. Pt states he had a head injury at work about last Monday where he went to [facility] for treatment. Pt states he slipped and fell at work and hit his head in a pile of steel and that's all he remembers prior to waking up in the hospital the next day. Pt states he wasn't told anything at the hospital and has no idea what the treatment looked like. Pt denies being intubated. When trying to get from the wheelchair to the bed, pt states he is so dizzy and put his torso on the bed. Pt then able to stand slightly to get in the bed.

## 2024-01-23 NOTE — ED NOTES
Boxed lunch and ginger ale provided to pt at this time per pt request and Dr. Andrade approval. Pt placed in semi fowlers, approved by Dr. Andrade. Updated Dr. Andrade of pt vital signs. Pt in cot. Call light in reach. Dr. White at bedside. No further needs voiced.

## 2024-01-23 NOTE — ED NOTES
Spoke to [facility] to confirm receiving first 2 tubes of CST. Remaining 2 tubes sent via tube system.

## 2024-01-23 NOTE — PROCEDURES
Lumbar Puncture    Date/Time: 1/22/2024 11:00 PM    Performed by: Tab Oro MD  Authorized by: Víctor Andrade DO    Consent:     Consent obtained:  Written and verbal    Consent given by:  Patient    Risks, benefits, and alternatives were discussed: yes      Risks discussed:  Bleeding, headache, infection, pain and nerve damage    Alternatives discussed:  No treatment  Universal protocol:     Patient identity confirmed:  Verbally with patient and hospital-assigned identification number  Pre-procedure details:     Procedure purpose:  Diagnostic    Preparation: Patient was prepped and draped in usual sterile fashion    Anesthesia:     Anesthesia method:  Local infiltration    Local anesthetic:  Lidocaine 1% w/o epi  Procedure details:     Lumbar space:  L4-L5 interspace    Patient position:  Sitting    Needle gauge:  22    Needle type:  Sprotte tip    Needle length (in):  3.5    Ultrasound guidance: no      Number of attempts:  1    Opening pressure (cm H2O):  32    Fluid appearance:  Blood-tinged then clearing    Tubes of fluid:  4  Post-procedure details:     Puncture site:  Direct pressure applied and adhesive bandage applied    Procedure completion:  Tolerated

## 2024-01-23 NOTE — PROGRESS NOTES
Discharge teaching and instructions for diagnosis/procedure of headache completed with patient using teachback method. AVS reviewed.  Patient voiced understanding regarding prescriptions, follow up appointments, and care of self at home. Discharged ambulatory and in a wheelchair to  home with support per  cab.

## 2024-01-23 NOTE — PROGRESS NOTES
Code Stroke Note    Last Known Well < 4.5 hours? No    Recent anticoagulant use (therapeutic Lovenox within 24 hrs, DOAC/DTI within 48 hrs if normal renal function, warfarin with INR > 1.7, PT > 15 sec, aPTT > 40 sec)? No    BP less than 185/110 mmHg (if no, include what medication was used to decrease BP)? No - given labetalol    Are there any other contraindications to TNK (previous intracranial hemorrhage, recent or active bleeding, intracranial or intraspinal surgery within 3 months, previous stroke within 3 months, intracranial neoplasm, GI malignancy, GI bleeding in previous 21 days, infective endocarditis, platelets < 100,000, stroke suspected to be due to aortic arch dissection)? N/A    Was TNK given (over 5 seconds & flushed before & after)? No    If yes, were orders placed in EPIC? N/A    If no, include reason for no TNK: contraindicated based on above criteria    If no and TNK was mixed, was the product returned to pharmacy? N/A    Dismissed from code stroke by: Provider Dr. Lupe Chavez [facility]  1/22/2024 8:58 PM

## 2024-01-23 NOTE — PROGRESS NOTES
[facility]  PHYSICAL THERAPY MISSED TREATMENT NOTE  [facility]    Date: 2024  Patient Name: Javid Valadez        MRN: [id number]   : 1980  (43 y.o.)  Gender: male                REASON FOR MISSED TREATMENT:  Pt being discharged upon arrival to room. Pt had no voiced concerns for therapy .      Milagros Smith, MPT [id number]'

## 2024-01-23 NOTE — FLOWSHEET NOTE
01/23/24 1006   Pain Assessment   Pain Level 3   Pain Location Head     Patient offered ice pack and cold wash clothe, patient refuses.

## 2024-01-23 NOTE — ED NOTES
ED to inpatient nurses report    Chief Complaint   Patient presents with    Headache    Dizziness      Present to ED from home  LOC: alert and orientated to name, place, date  Vital signs   Vitals:    01/23/24 0005 01/23/24 0006 01/23/24 0015 01/23/24 0100   BP:  (!) 152/105 (!) 147/105 (!) 169/105   Pulse: 82 77 83 92   Resp: 23 19 20 17   Temp:       TempSrc:       SpO2:   96% 94%   Weight:          Oxygen Baseline room air    Current needs required room air Bipap/Cpap No  LDAs:   Peripheral IV 01/22/24 Left Antecubital (Active)   Site Assessment Clean, dry & intact 01/23/24 0104   Line Status Infusing 01/23/24 0104   Line Care Connections checked and tightened 01/23/24 0104   Phlebitis Assessment No symptoms 01/23/24 0104   Infiltration Assessment 0 01/23/24 0104   Dressing Status Clean, dry & intact 01/23/24 0104   Dressing Type Transparent 01/23/24 0104   Dressing Intervention New 01/22/24 2057     Mobility: Independent  Pending ED orders: none  Present condition: stable    C-SSRS Risk of Suicide: No Risk  Swallow Screening    Preferred Language: English     Electronically signed by Maya Webb LPN on 1/23/2024 at 1:32 AM

## 2024-01-23 NOTE — H&P
History    has a past medical history of Anxiety, CAD (coronary artery disease), Chronic back pain, Depression, Headache(784.0), Hypertension, Kidney stone, TBI (traumatic brain injury) (HCC), and Unspecified sleep apnea.    has a past surgical history that includes tracheostomy closure; Tracheotomy; and Plantar fascia surgery (Left, 7/10/2020).     Social History Family History    reports that he has been smoking cigarettes. He started smoking about 10 years ago. He has a 5.0 pack-year smoking history. He has never used smokeless tobacco. He reports that he does not drink alcohol and does not use drugs. family history includes Diabetes in his mother; High Blood Pressure in his father.     Outpatient Medications   Current Outpatient Medications   Medication Instructions    cloNIDine (CATAPRES) 0.3 mg, Oral, 2 TIMES DAILY    DULoxetine (CYMBALTA) 60 mg, Oral, DAILY    hydrOXYzine pamoate (VISTARIL) 25 mg, Oral, 3 TIMES DAILY PRN    ketorolac (TORADOL) 10 mg, Oral, EVERY 6 HOURS PRN    lamoTRIgine (LAMICTAL) 100 mg, Oral, DAILY    losartan (COZAAR) 50 mg, Oral, DAILY    metoprolol succinate (TOPROL XL) 25 mg, Oral, DAILY    naproxen (NAPROSYN) 500 mg, Oral, 2 TIMES DAILY    QUEtiapine (SEROQUEL) 25 mg, Oral, NIGHTLY    tamsulosin (FLOMAX) 0.4 mg, Oral, DAILY    Xarelto 10 mg, Oral, DAILY WITH BREAKFAST        Allergies   Robitussin [guaifenesin]     Immunizations   Immunization History   Administered Date(s) Administered    COVID-19, PFIZER PURPLE top, DILUTE for use, (age 12 y+), 30mcg/0.3mL 06/17/2021, 07/08/2021        Review of Systems - negative except for the aforementioned    Objective     Vitals:  BP (!) 147/105   Pulse 83   Temp 98.1 °F (36.7 °C) (Oral)   Resp 20   Wt 99.8 kg (220 lb)   SpO2 96%   BMI 30.68 kg/m²     Physical Exam  Constitutional:       Appearance: Normal appearance. He is obese. He is not diaphoretic.   HENT:      Head: Normocephalic and atraumatic.      Mouth/Throat:      Mouth:

## 2024-01-23 NOTE — ED PROVIDER NOTES
[facility] EMERGENCY DEPT      EMERGENCY MEDICINE     Pt Name: Javid Valadez  MRN: [id number]  Birthdate 1980  Date of evaluation: 1/22/2024  Provider: CALVIN IBRAHIM DO, FACEP    CHIEF COMPLAINT       Chief Complaint   Patient presents with    Headache    Dizziness     HISTORY OF PRESENT ILLNESS   Javid Valadez is a pleasant 43 y.o. male who presents to the emergency department for evaluation of severe headache and dizziness.  Patient states symptoms started tonight approximately 5:30 PM.  He was at rest when this occurred.  He states he recently had a traumatic brain injury, was admitted apparently at [facility].  Apparently did have 2 seizures at that time, although does not have any history of seizures.  He does not recall the events surrounding this.  Has been relatively asymptomatic until tonight when the symptoms occurred.  The headache onset was abrupt, reaching more with point maximal intensity almost immediately.  Experience some numbness all throughout, significant dizziness associated with this.  He is unable to verbalize the referral of the headache or the location.  Patient's wife is on the phone with him at the same time, she confirms the history.  Patient is not currently taking anticoagulation, specifically not taking the Pradaxa that is in his previous records    PASTMEDICAL HISTORY/Co-Morbid Conditions:     Past Medical History:   Diagnosis Date    Anxiety     CAD (coronary artery disease)     Chronic back pain     Depression     Headache(784.0)     Hypertension     Kidney stone     TBI (traumatic brain injury) ([facility])     Unspecified sleep apnea        Patient Active Problem List   Diagnosis Code    Anxiety F41.9    Back pain, chronic M54.9, G89.29     SURGICAL HISTORY       Past Surgical History:   Procedure Laterality Date    PLANTAR FASCIA SURGERY Left 7/10/2020    LEFT TIBIAL BONE MARROW HARVEST, LEFT TARSAL TUNNEL DECOMPRESSION, INSTEP PLANTAR FASCIOTOMY,

## 2024-01-24 LAB
EKG ATRIAL RATE: 90 BPM
EKG P AXIS: 61 DEGREES
EKG P-R INTERVAL: 170 MS
EKG Q-T INTERVAL: 370 MS
EKG QRS DURATION: 94 MS
EKG QTC CALCULATION (BAZETT): 452 MS
EKG R AXIS: 47 DEGREES
EKG T AXIS: 60 DEGREES
EKG VENTRICULAR RATE: 90 BPM

## 2024-01-24 NOTE — PROGRESS NOTES
Physician Progress Note      PATIENT:               KAEL ORONA  CSN #:                  [id number]  :                       1980  ADMIT DATE:       2024 7:43 PM  DISCH DATE:        2024 12:15 PM  RESPONDING  PROVIDER #:        SP WHITE          QUERY TEXT:    Dr Barba, Dr White,    Pt admitted with headache.  Per ED Physician: Hypertension Emergency. Pt   treated with IV Cardene. If possible, please document in progress notes and   discharge summary:    The medical record reflects the following:  Risk Factors: Headache, Code stroke initiated in ED.  Clinical Indicators: /116  Map 141  /111 map 134  Treatment: IV Cardene ip    Evelyn Hood BSN, RN  Options provided:  -- Hypertension Emergency confirmed present on admission  -- Hypertension Emergency ruled out  -- Other - I will add my own diagnosis  -- Disagree - Not applicable / Not valid  -- Disagree - Clinically unable to determine / Unknown  -- Refer to Clinical Documentation Reviewer    PROVIDER RESPONSE TEXT:    Just regular old hypertension. No emergency    Query created by: Morena Hood on 2024 9:09 AM      Electronically signed by:  SP WHITE 2024 11:23 PM

## 2024-01-27 LAB
BACTERIA CSF CULT: NORMAL
BACTERIA SPEC ANAEROBE CULT: NORMAL
GRAM STN SPEC: NORMAL

## (undated) DEVICE — GOWN,AURORA,NON-REINFORCED,2XL: Brand: MEDLINE

## (undated) DEVICE — BANDAGE COMPR E SGL LAYERED CLSR BGE W/ CLP W4INXL15FT

## (undated) DEVICE — APPLICATOR MEDICATED 26 CC SOLUTION HI LT ORNG CHLORAPREP

## (undated) DEVICE — SPONGE LAP W18XL18IN WHT COT 4 PLY FLD STRUNG RADPQ DISP ST

## (undated) DEVICE — GOWN,SIRUS,NON REINFRCD,LARGE,SET IN SL: Brand: MEDLINE

## (undated) DEVICE — DRAPE,U/SHT,SPLIT,FILM,60X84,STERILE: Brand: MEDLINE

## (undated) DEVICE — GLOVE ORANGE PI 7   MSG9070

## (undated) DEVICE — TETRA-FLEX CF WOVEN LATEX FREE ELASTIC BANDAGE 6" X 5.5 YD: Brand: TETRA-FLEX™CF

## (undated) DEVICE — BANDAGE,GAUZE,4.5"X4.1YD,STERILE,LF: Brand: MEDLINE

## (undated) DEVICE — IMPREGNATED GAUZE DRESSING: Brand: CUTICERIN 7.5X7.5CM CTN 50

## (undated) DEVICE — BANDAGE COMPR W4INXL12FT E DISP ESMARCH EVEN

## (undated) DEVICE — PADDING CAST W6INXL4YD COT LO LINTING WYTEX

## (undated) DEVICE — PADDING,UNDERCAST,COTTON, 4"X4YD STERILE: Brand: MEDLINE

## (undated) DEVICE — COVER ARMBRD W13XL28.5IN IMPERV BLU FOR OP RM

## (undated) DEVICE — GAUZE,SPONGE,2"X2",8PLY,STERILE,LF,2'S: Brand: MEDLINE

## (undated) DEVICE — SPONGE GZ W4XL4IN COT 12 PLY TYP VII WVN C FLD DSGN

## (undated) DEVICE — STRIP,CLOSURE,WOUND,MEDI-STRIP,1/2X4: Brand: MEDLINE

## (undated) DEVICE — GOWN,SIRUS,NONRNF,SETINSLV,XL,20/CS: Brand: MEDLINE

## (undated) DEVICE — PAD,ABDOMINAL,5"X9",ST,LF,25/BX: Brand: MEDLINE INDUSTRIES, INC.

## (undated) DEVICE — 3M™ STERI-STRIP™ COMPOUND BENZOIN TINCTURE 40 BAGS/CARTON 4 CARTONS/CASE C1544: Brand: 3M™ STERI-STRIP™

## (undated) DEVICE — GLOVE ORANGE PI 7 1/2   MSG9075

## (undated) DEVICE — GLOVE SURG SZ 65 THK91MIL LTX FREE SYN POLYISOPRENE

## (undated) DEVICE — 1016 S-DRAPE IRRIG POUCH 10/BOX: Brand: STERI-DRAPE™